# Patient Record
Sex: MALE | Race: WHITE | NOT HISPANIC OR LATINO | Employment: OTHER | URBAN - METROPOLITAN AREA
[De-identification: names, ages, dates, MRNs, and addresses within clinical notes are randomized per-mention and may not be internally consistent; named-entity substitution may affect disease eponyms.]

---

## 2017-01-02 ENCOUNTER — CLINICAL SUPPORT NO REQUIREMENTS (OUTPATIENT)
Dept: CARDIOLOGY | Facility: CLINIC | Age: 72
End: 2017-01-02

## 2017-01-03 ENCOUNTER — HOSPITAL ENCOUNTER (OUTPATIENT)
Dept: PHYSICAL THERAPY | Facility: HOSPITAL | Age: 72
Setting detail: THERAPIES SERIES
Discharge: HOME | End: 2017-01-03

## 2017-01-03 DIAGNOSIS — I63.533 CEREBROVASCULAR ACCIDENT (CVA) DUE TO BILATERAL STENOSIS OF POSTERIOR CEREBRAL ARTERIES (HCC): Primary | ICD-10-CM

## 2017-01-03 DIAGNOSIS — R53.1 RIGHT SIDED WEAKNESS: ICD-10-CM

## 2017-01-03 PROCEDURE — 97110 THERAPEUTIC EXERCISES: CPT | Performed by: PHYSICAL THERAPIST

## 2017-01-03 PROCEDURE — 97032 APPL MODALITY 1+ESTIM EA 15: CPT | Performed by: PHYSICAL THERAPIST

## 2017-01-03 PROCEDURE — 97112 NEUROMUSCULAR REEDUCATION: CPT | Performed by: PHYSICAL THERAPIST

## 2017-01-03 NOTE — PROGRESS NOTES
Outpatient Physical Therapy Ortho Treatment Note  [facility]     Patient Name: Brayan Cintron Jr  : 1945  MRN: [id number]  Today's Date: 1/3/2017      Visit Date: 2017    Visit Dx:    ICD-10-CM ICD-9-CM   1. Cerebrovascular accident (CVA) due to bilateral stenosis of posterior cerebral arteries I63.533 434.91   2. Right sided weakness M62.81 728.87       Patient Active Problem List   Diagnosis   • Hypertension   • SVT (supraventricular tachycardia)   • Chronic renal impairment, stage 2 (mild)   • Stroke   • History of shingles 3 months ago   • Cerebrovascular accident (CVA)   • Hyperlipidemia        Past Medical History   Diagnosis Date   • Acute renal failure    • Arrhythmia    • Chronic renal insufficiency    • Colon polyp    • Edema    • Hyperlipidemia    • Hypertension    • Melanoma    • XENA (obstructive sleep apnea)    • Right arm numbness    • Right facial numbness    • Right leg numbness    • Right sided weakness    • Shingles    • Squamous cell carcinoma    • Squamous cell carcinoma of skin    • Stroke    • SVT (supraventricular tachycardia)    • Syncope    • Tachycardia         Past Surgical History   Procedure Laterality Date   • Cardiac ablation     • Cardiac electrophysiology procedure N/A 10/3/2016     Procedure: Reveal implant  LINQ;  Surgeon: Wong Zavala MD;  Location: [facility] CATH INVASIVE LOCATION;  Service:    • Cyst removal       off tailbone   • Cardiac electrophysiology procedure Left 2016     Procedure: Loop recorder removal and reimplant  linq;  Surgeon: Wong Zavala MD;  Location: [facility] CATH INVASIVE LOCATION;  Service:                              PT Assessment/Plan       17 1730 16 1501       PT Assessment    Functional Limitations  Decreased safety during functional activities;Limitations in community activities;Performance in sport activities;Limitations in functional capacity and performance;Impaired gait;Performance in leisure  activities;Performance in self-care ADL;Limitation in home management  -KH     Impairments  Balance  -     Assessment Comments Initiated balance training today but patient appears frustrated by the difficulty of the balance exercises and with the lack of sensation/pain in his right outer foot. Attempted to strengthen VMO with patient in hookyling by performing SAQ's with NMES with 10 seconds on and 20 seconds rest. He could feel the sensation of the current but reported no change in sensation after the session. Discussed benefits of patient receiving therapy from speech and OT as well since he would truly benefit from all 3 disciplines and that if easier logistically her could transfer over there for PT as well.   - Patient is a 70 y/o male who recently suffered from 2 CVA's in the last 2 months that have left him with decreased sensation in his right thumb/index finger and right lower leg/foot, decreased R LE/hand strength, impaired balance, and a right lower quadrant visual cut. He reports being active prior to the stroke and regularly came to [place] to lift weights and swim. His biggest concern is the numbness in his right hand and foot. It is difficult for him to  small objects due to poor fine motor control and he now walks with his R LE externally rotated due to feeling off balance. His strength is nearly WNL with MMT but his R LE presents with ~1/2 deficit compared to the left. Overall his most noticeable strength deficits are in his right plantar flexor muscles, his right tip pinch  muscles, and his right hip abductors. He has difficulty maintaining balance with R SLS or tandem stance with R LE in front and is frustrated that he is unable to be as active as he was prior to the CVA. Patient would benefit from skilled PT to address balance and strength deficits in order to return to active lifestyle and improve quality of life.   -KRANTHI     Please refer to paper survey for additional  self-reported information  Yes  -     Rehab Potential  Good  -     Patient/caregiver participated in establishment of treatment plan and goals  Yes  -     Patient would benefit from skilled therapy intervention  Yes  -KH     PT Plan    PT Frequency  2x/week  -     Predicted Duration of Therapy Intervention (days/wks)  4 weeks  -     Planned CPT's?  PT EVAL: 18165;PT RE-EVAL: 74719;PT THER PROC EA 15 MIN: 96880;PT NEUROMUSC RE-EDUCATION EA 15 MIN: 61768;PT AQUATIC THERAPY EA 15 MIN: 96045;PT ULTRASOUND EA 15 MIN: 81659;PT HOT OR COLD PACK TREAT MCARE;PT MANUAL THERAPY EA 15 MIN: 13826;PT THER ACT EA 15 MIN: 73921;PT GAIT TRAINING EA 15 MIN: 01789;PT SELF CARE/HOME MGMT/TRAIN EA 15: 16832;PT ELECTRICAL STIM ATTD EA 15 MIN: 79550;PT ELECTRICAL STIM UNATTEND:   -     Physical Therapy Interventions (Optional Details)  aquatics exercise;gross motor skills;stretching;taping;home exercise program;balance training;joint mobilization;patient/family education;postural re-education;ROM (Range of Motion);stair training;strengthening;gait training;modalities;fine motor skills;dry needling;manual therapy techniques;lumbar stabilization  -     PT Plan Comments Progress with balance training and R LE strengthening. Continue with NMES to increase VMO strength and to potentially to try and activate right foot eversion muscles.   - Focus on balance training, R LE strengthening, fine motor skills, and consider NMES to help with diminished sensation  -       User Key  (r) = Recorded By, (t) = Taken By, (c) = Cosigned By    Initials Name Provider Type    KRANTHI To PT Physical Therapist                Modalities       01/03/17 1700          ELECTRICAL STIMULATION    Attended/Unattended Attended  -      Stimulation Type --   Ukrainian current  -      Max mAmp 23  -      Location/Electrode Placement/Other Right VMO/quadratus femoris  -      Rx Minutes 10 mins   10 seconds on/20 seconds off  -        User  Key  (r) = Recorded By, (t) = Taken By, (c) = Cosigned By    Initials Name Provider Type    KRANTHI To PT Physical Therapist      ** performed SAQ with knee over bolster and patient in hooklying          Exercises       01/03/17 1700          Subjective Comments    Subjective Comments Nothing has changed since my eval. It's still driving me nuts that I can't feel parts of my right leg and hand  -      Subjective Pain    Able to rate subjective pain? yes  -      Pre-Treatment Pain Level 2  -      Post-Treatment Pain Level 2  -      Subjective Pain Comment pins and needles in right foot, hand, and lower leg  -      Exercise 1    Exercise Name 1 see flowsheet  -        User Key  (r) = Recorded By, (t) = Taken By, (c) = Cosigned By    Initials Name Provider Type    KRANTHI To PT Physical Therapist                               PT OP Goals       01/03/17 1700 12/29/16 1400       PT Short Term Goals    STG Date to Achieve 01/28/17  -KH 01/28/17  -     STG 1 Patient will report 25% improvement in picking up small objects such as house keys  - Patient will report 25% improvement in picking up small objects such as house keys  -     STG 1 Progress Ongoing  - New  -     STG 2 Patient will increase tip pinch strength on the right hand from 10 lbs. to 15 lbs. or greater to help with everyday tasks  - Patient will increase tip pinch strength on the right hand from 10 lbs. to 15 lbs. or greater to help with everyday tasks  -     STG 2 Progress Ongoing  - New  -     STG 3 Patient will increase R LE SLS from 1-2 seconds to 10 seconds or great to prevent LOB/falls  - Patient will increase R LE SLS from 1-2 seconds to 10 seconds or great to prevent LOB/falls  -     STG 3 Progress Ongoing  - New  -     STG 3 Progress Comments ~3 seconds today  -      Long Term Goals    LTG Date to Achieve 02/27/17  -KH 02/27/17  -     LTG 1 Patient will perform 18 reps (or more) of full ROM S/L calf  rasises on the R LE to help decrease muscle disparity between R and L ankles and to help increase ankle stability for balance   -KH Patient will perform 18 reps (or more) of full ROM S/L calf rasises on the R LE to help decrease muscle disparity between R and L ankles and to help increase ankle stability for balance   -KH     LTG 1 Progress Ongoing  -KH New  -KH     LTG 2 Patient will decrease level of perceived disability as measured by the LEFS from 23.7% disability (61/80) to <15% disability  -KH Patient will decrease level of perceived disability as measured by the LEFS from 23.7% disability (61/80) to <15% disability  -KH     LTG 2 Progress Ongoing  -KH New  -KH     LTG 3 Patient will demonstrate safety and independence with strength training at [facility]  -KH Patient will demonstrate safety and independence with strength training at [facility]  -KH     LTG 3 Progress Ongoing  -KH New  -KH     Time Calculation    PT Goal Re-Cert Due Date  01/28/17  -       User Key  (r) = Recorded By, (t) = Taken By, (c) = Cosigned By    Initials Name Provider Type    KRANTHI To PT Physical Therapist                Therapy Education       12/29/16 1439    Therapy Education    Given HEP;Symptoms/condition management  -    Program New  -    How Provided Verbal;Demonstration  -KH    Provided to Patient  -    Level of Understanding Teach back education performed  -      User Key  (r) = Recorded By, (t) = Taken By, (c) = Cosigned By    Initials Name Provider Type    KRANTHI To PT Physical Therapist                Time Calculation:   Start Time: 1518  Stop Time: 1600  Time Calculation (min): 42 min    Therapy Charges for Today     Code Description Service Date Service Provider Modifiers Qty    23388910225 HC PT THER PROC EA 15 MIN 1/3/2017 Latrice To PT GP 1    12073811113 HC PT NEUROMUSC RE EDUCATION EA 15 MIN 1/3/2017 Latrice To PT GP 1    19075413561 HC PT ELEC STIM EA-PER 15 MIN 1/3/2017 Latrice To, BO GP  1                    Latrice To, PT  1/3/2017

## 2017-01-04 ENCOUNTER — CLINICAL SUPPORT NO REQUIREMENTS (OUTPATIENT)
Dept: CARDIOLOGY | Facility: CLINIC | Age: 72
End: 2017-01-04

## 2017-01-05 ENCOUNTER — HOSPITAL ENCOUNTER (OUTPATIENT)
Dept: PHYSICAL THERAPY | Facility: HOSPITAL | Age: 72
Setting detail: THERAPIES SERIES
Discharge: HOME | End: 2017-01-05

## 2017-01-05 DIAGNOSIS — I63.533 CEREBROVASCULAR ACCIDENT (CVA) DUE TO BILATERAL STENOSIS OF POSTERIOR CEREBRAL ARTERIES (HCC): Primary | ICD-10-CM

## 2017-01-05 DIAGNOSIS — R53.1 RIGHT SIDED WEAKNESS: ICD-10-CM

## 2017-01-05 PROCEDURE — 97112 NEUROMUSCULAR REEDUCATION: CPT | Performed by: PHYSICAL THERAPIST

## 2017-01-05 PROCEDURE — 97110 THERAPEUTIC EXERCISES: CPT | Performed by: PHYSICAL THERAPIST

## 2017-01-05 PROCEDURE — 97032 APPL MODALITY 1+ESTIM EA 15: CPT | Performed by: PHYSICAL THERAPIST

## 2017-01-05 NOTE — PROGRESS NOTES
Outpatient Physical Therapy Neuro Treatment Note  [facility]     Patient Name: Brayan Cintron Jr  : 1945  MRN: [id number]  Today's Date: 2017      Visit Date: 2017    Visit Dx:    ICD-10-CM ICD-9-CM   1. Cerebrovascular accident (CVA) due to bilateral stenosis of posterior cerebral arteries I63.533 434.91   2. Right sided weakness M62.81 728.87       Patient Active Problem List   Diagnosis   • Hypertension   • SVT (supraventricular tachycardia)   • Chronic renal impairment, stage 2 (mild)   • Stroke   • History of shingles 3 months ago   • Cerebrovascular accident (CVA)   • Hyperlipidemia                             PT Assessment/Plan       17 1430 17 1730       PT Assessment    Assessment Comments No changed in sensation reported today but increased the max amplitude on the NMES (Once again utilized [place] Island[place] current with 10 on 20 off and Brayan performed SAQ in hooklying with knee over bolster). Added more balance exercises today with M-L stability on balance board, mini squats and D/L stability on balance discs, and side step cable walks. Also added the clock exercise to work on weight shifting onto the R LE and to increase neuro input through WB. Brayan had difficulty coordinating his steps and shifting his weight while maintaining balance.   -KRANTHI Initiated balance training today but patient appears frustrated by the difficulty of the balance exercises and with the lack of sensation/pain in his right outer foot. Attempted to strengthen VMO with patient in hookyling by performing SAQ's with NMES with 10 seconds on and 20 seconds rest. He could feel the sensation of the current but reported no change in sensation after the session. Discussed benefits of patient receiving therapy from speech and OT as well since he would truly benefit from all 3 disciplines and that if easier logistically her could transfer over there for PT as well.   -KRANTHI     PT Plan    PT Plan Comments  Progress with  balance training and R LE strengthening. Continue with NMES to increase VMO strength and to potentially to try and activate right foot eversion muscles.   -KRANTHI       User Key  (r) = Recorded By, (t) = Taken By, (c) = Cosigned By    Initials Name Provider Type    KRANTHI To, BO Physical Therapist                 Modalities       01/05/17 1400          ELECTRICAL STIMULATION    Attended/Unattended Attended  -      Stimulation Type --   [place]udian Current  -      Max mAmp 38  -      Location/Electrode Placement/Other Right VMO/quadratus femoris  -      Rx Minutes 10 mins   10 sec on, 20 sec off. SAQ performed during on phase   -KRANTHI        User Key  (r) = Recorded By, (t) = Taken By, (c) = Cosigned By    Initials Name Provider Type    KRANTHI To PT Physical Therapist                Exercises       01/05/17 1400          Subjective Comments    Subjective Comments I tried walking for 30 min prior to therapy but my foot still feels like it's asleep. I'm trying to work on keeping my foot straight.   -KRANTHI      Subjective Pain    Able to rate subjective pain? yes  -KRANTHI      Pre-Treatment Pain Level 2  -KRANTHI      Post-Treatment Pain Level 2  -KRANTHI      Subjective Pain Comment not so much pain but feels like my foot and fingers are asleep  -KRANTHI      Exercise 1    Exercise Name 1 see flowsheet  -        User Key  (r) = Recorded By, (t) = Taken By, (c) = Cosigned By    Initials Name Provider Type    KRANTHI To, BO Physical Therapist                              PT OP Goals       01/03/17 1700 12/29/16 1400       PT Short Term Goals    STG Date to Achieve 01/28/17  -KRANTHI 01/28/17  -     STG 1 Patient will report 25% improvement in picking up small objects such as house keys  -KRATNHI Patient will report 25% improvement in picking up small objects such as house keys  -KRANTHI     STG 1 Progress Ongoing  - New  -     STG 2 Patient will increase tip pinch strength on the right hand from 10 lbs. to 15 lbs. or greater to help with  everyday tasks  -KH Patient will increase tip pinch strength on the right hand from 10 lbs. to 15 lbs. or greater to help with everyday tasks  -     STG 2 Progress Ongoing  - New  -     STG 3 Patient will increase R LE SLS from 1-2 seconds to 10 seconds or great to prevent LOB/falls  -KH Patient will increase R LE SLS from 1-2 seconds to 10 seconds or great to prevent LOB/falls  -     STG 3 Progress Ongoing  - New  -     STG 3 Progress Comments ~3 seconds today  -      Long Term Goals    LTG Date to Achieve 02/27/17  -KH 02/27/17  -     LTG 1 Patient will perform 18 reps (or more) of full ROM S/L calf rasises on the R LE to help decrease muscle disparity between R and L ankles and to help increase ankle stability for balance   -KH Patient will perform 18 reps (or more) of full ROM S/L calf rasises on the R LE to help decrease muscle disparity between R and L ankles and to help increase ankle stability for balance   -     LTG 1 Progress Ongoing  - New  [facility]     LTG 2 Patient will decrease level of perceived disability as measured by the LEFS from 23.7% disability (61/80) to <15% disability  -KH Patient will decrease level of perceived disability as measured by the LEFS from 23.7% disability (61/80) to <15% disability  -     LTG 2 Progress Ongoing  - New  [facility]     LTG 3 Patient will demonstrate safety and independence with strength training at [facility]  - Patient will demonstrate safety and independence with strength training at [facility]  -     LTG 3 Progress Ongoing  - New  [facility]     Time Calculation    PT Goal Re-Cert Due Date  01/28/17  -       User Key  (r) = Recorded By, (t) = Taken By, (c) = Cosigned By    Initials Name Provider Type    KRANTHI To PT Physical Therapist                    Time Calculation:   Start Time: 1420  Stop Time: 1515  Time Calculation (min): 55 min     Therapy Charges for Today     Code Description Service Date Service Provider Modifiers Qty     91067156144 HC PT THER PROC EA 15 MIN 1/5/2017 Latrice To, PT GP 1    71759635985 HC PT NEUROMUSC RE EDUCATION EA 15 MIN 1/5/2017 Latrice To, PT GP 2    37059520743 HC PT ELEC STIM EA-PER 15 MIN 1/5/2017 Latrice To, PT GP 1                    Latrice To, PT  1/5/2017

## 2017-01-10 ENCOUNTER — HOSPITAL ENCOUNTER (OUTPATIENT)
Dept: PHYSICAL THERAPY | Facility: HOSPITAL | Age: 72
Setting detail: THERAPIES SERIES
Discharge: HOME | End: 2017-01-10

## 2017-01-10 DIAGNOSIS — R53.1 RIGHT SIDED WEAKNESS: ICD-10-CM

## 2017-01-10 DIAGNOSIS — I63.533 CEREBROVASCULAR ACCIDENT (CVA) DUE TO BILATERAL STENOSIS OF POSTERIOR CEREBRAL ARTERIES (HCC): Primary | ICD-10-CM

## 2017-01-10 PROCEDURE — G0283 ELEC STIM OTHER THAN WOUND: HCPCS | Performed by: PHYSICAL THERAPIST

## 2017-01-10 PROCEDURE — 97110 THERAPEUTIC EXERCISES: CPT | Performed by: PHYSICAL THERAPIST

## 2017-01-10 PROCEDURE — 97112 NEUROMUSCULAR REEDUCATION: CPT | Performed by: PHYSICAL THERAPIST

## 2017-01-10 NOTE — PROGRESS NOTES
Outpatient Physical Therapy Ortho Treatment Note  [facility]     Patient Name: Brayan Cintron Jr  : 1945  MRN: [id number]  Today's Date: 1/10/2017      Visit Date: 01/10/2017    Visit Dx:    ICD-10-CM ICD-9-CM   1. Cerebrovascular accident (CVA) due to bilateral stenosis of posterior cerebral arteries I63.533 434.91   2. Right sided weakness M62.81 728.87       Patient Active Problem List   Diagnosis   • Hypertension   • SVT (supraventricular tachycardia)   • Chronic renal impairment, stage 2 (mild)   • Stroke   • History of shingles 3 months ago   • Cerebrovascular accident (CVA)   • Hyperlipidemia        Past Medical History   Diagnosis Date   • Acute renal failure    • Arrhythmia    • Chronic renal insufficiency    • Colon polyp    • Edema    • Hyperlipidemia    • Hypertension    • Melanoma    • XENA (obstructive sleep apnea)    • Right arm numbness    • Right facial numbness    • Right leg numbness    • Right sided weakness    • Shingles    • Squamous cell carcinoma    • Squamous cell carcinoma of skin    • Stroke    • SVT (supraventricular tachycardia)    • Syncope    • Tachycardia         Past Surgical History   Procedure Laterality Date   • Cardiac ablation     • Cardiac electrophysiology procedure N/A 10/3/2016     Procedure: Reveal implant  LINQ;  Surgeon: Wong Zavala MD;  Location: [facility] CATH INVASIVE LOCATION;  Service:    • Cyst removal       off tailbone   • Cardiac electrophysiology procedure Left 2016     Procedure: Loop recorder removal and reimplant  linq;  Surgeon: Wong Zavala MD;  Location: [facility] CATH INVASIVE LOCATION;  Service:                  PT Assessment/Plan       01/10/17 1512 17 1430 17 1730    PT Assessment    Assessment Comments Brayan tolerated treatment well.  He progressed therapeutic exercises by beginning cable walks with the forward and reverse directions.  -MP No changed in sensation reported today but increased the max amplitude on the  NMES (Once again utilized [place] current with 10 on 20 off and Brayan performed SAQ in hooklying with knee over bolster). Added more balance exercises today with M-L stability on balance board, mini squats and D/L stability on balance discs, and side step cable walks. Also added the clock exercise to work on weight shifting onto the R LE and to increase neuro input through WB. Brayan had difficulty coordinating his steps and shifting his weight while maintaining balance.   -KRANTHI Initiated balance training today but patient appears frustrated by the difficulty of the balance exercises and with the lack of sensation/pain in his right outer foot. Attempted to strengthen VMO with patient in hookyling by performing SAQ's with NMES with 10 seconds on and 20 seconds rest. He could feel the sensation of the current but reported no change in sensation after the session. Discussed benefits of patient receiving therapy from speech and OT as well since he would truly benefit from all 3 disciplines and that if easier logistically her could transfer over there for PT as well.   -    PT Plan    PT Plan Comments   Progress with balance training and R LE strengthening. Continue with NMES to increase VMO strength and to potentially to try and activate right foot eversion muscles.   -      User Key  (r) = Recorded By, (t) = Taken By, (c) = Cosigned By    Initials Name Provider Type    KRANTHI To, PT Physical Therapist    ELSA Perry, PT Physical Therapist                Exercises       01/10/17 1500          Subjective Pain    Able to rate subjective pain? yes  -MP      Pre-Treatment Pain Level 2  -MP      Post-Treatment Pain Level 2  -MP      Exercise 1    Exercise Name 1 Refer to land flow sheet  -MP      Exercise 2    Exercise Name 2 Progressed cable walks by adding the forward and reverse directions.  He did 12.5 lbs for those and left/right each x 3.  -        User Key  (r) = Recorded By, (t) = Taken By, (c) = Cosigned  By    Initials Name Provider Type    MP Jaden Perry, PT Physical Therapist                PT OP Goals       01/10/17 1500 01/03/17 1700 12/29/16 1400    PT Short Term Goals    STG Date to Achieve 01/28/17  -MP 01/28/17  -KH 01/28/17  -KH    STG 1 Patient will report 25% improvement in picking up small objects such as house keys  -MP Patient will report 25% improvement in picking up small objects such as house keys  -KH Patient will report 25% improvement in picking up small objects such as house keys  -KH    STG 1 Progress Ongoing  -MP Ongoing  -KH New  -    STG 2 Patient will increase tip pinch strength on the right hand from 10 lbs. to 15 lbs. or greater to help with everyday tasks  -MP Patient will increase tip pinch strength on the right hand from 10 lbs. to 15 lbs. or greater to help with everyday tasks  -KH Patient will increase tip pinch strength on the right hand from 10 lbs. to 15 lbs. or greater to help with everyday tasks  -KH    STG 2 Progress Ongoing  -MP Ongoing  - New  -    STG 3 Patient will increase R LE SLS from 1-2 seconds to 10 seconds or great to prevent LOB/falls  -MP Patient will increase R LE SLS from 1-2 seconds to 10 seconds or great to prevent LOB/falls  -KH Patient will increase R LE SLS from 1-2 seconds to 10 seconds or great to prevent LOB/falls  -    STG 3 Progress Ongoing  -MP Ongoing  - New  -    STG 3 Progress Comments  ~3 seconds today  -     Long Term Goals    LTG Date to Achieve 02/27/17  -MP 02/27/17  -KH 02/27/17  -KH    LTG 1 Patient will perform 18 reps (or more) of full ROM S/L calf rasises on the R LE to help decrease muscle disparity between R and L ankles and to help increase ankle stability for balance   -MP Patient will perform 18 reps (or more) of full ROM S/L calf rasises on the R LE to help decrease muscle disparity between R and L ankles and to help increase ankle stability for balance   -KH Patient will perform 18 reps (or more) of full ROM S/L calf  rasises on the R LE to help decrease muscle disparity between R and L ankles and to help increase ankle stability for balance   -    LTG 1 Progress Ongoing  -MP Ongoing  -KH New  -    LTG 2 Patient will decrease level of perceived disability as measured by the LEFS from 23.7% disability (61/80) to <15% disability  -MP Patient will decrease level of perceived disability as measured by the LEFS from 23.7% disability (61/80) to <15% disability  -KH Patient will decrease level of perceived disability as measured by the LEFS from 23.7% disability (61/80) to <15% disability  -KH    LTG 2 Progress Ongoing  -MP Ongoing  -KH New  -    LTG 3 Patient will demonstrate safety and independence with strength training at [facility]  -MP Patient will demonstrate safety and independence with strength training at [facility]  -KH Patient will demonstrate safety and independence with strength training at [facility]  -    LTG 3 Progress Ongoing  -MP Ongoing  - New  -    Time Calculation    PT Goal Re-Cert Due Date   01/28/17  -      User Key  (r) = Recorded By, (t) = Taken By, (c) = Cosigned By    Initials Name Provider Type     Latrice To PT Physical Therapist    MP aJden Perry PT Physical Therapist                Therapy Education       01/10/17 1512    Therapy Education    Given HEP  -MP    Program Reinforced  -MP    How Provided Verbal  -MP    Provided to Patient  -MP    Level of Understanding Verbalized  -MP      User Key  (r) = Recorded By, (t) = Taken By, (c) = Cosigned By    Initials Name Provider Type    ELSA Perry PT Physical Therapist        Time Calculation:   Start Time: 1415  Stop Time: 1500  Time Calculation (min): 45 min    Therapy Charges for Today     Code Description Service Date Service Provider Modifiers Qty    11894964998 HC PT NEUROMUSC RE EDUCATION EA 15 MIN 1/10/2017 Jaden Perry, PT GP 1    23636319592 HC PT THER PROC EA 15 MIN 1/10/2017 Jaden Perry, PT GP 1    47929578291 HC ELECTRICAL  STIM UNATTENDED 1/10/2017 Jaden Perry, PT  1        Jaden Perry, PT  1/10/2017

## 2017-01-12 ENCOUNTER — HOSPITAL ENCOUNTER (OUTPATIENT)
Dept: PHYSICAL THERAPY | Facility: HOSPITAL | Age: 72
Setting detail: THERAPIES SERIES
Discharge: HOME | End: 2017-01-12

## 2017-01-12 DIAGNOSIS — R53.1 RIGHT SIDED WEAKNESS: ICD-10-CM

## 2017-01-12 DIAGNOSIS — I63.533 CEREBROVASCULAR ACCIDENT (CVA) DUE TO BILATERAL STENOSIS OF POSTERIOR CEREBRAL ARTERIES (HCC): Primary | ICD-10-CM

## 2017-01-12 PROCEDURE — 97032 APPL MODALITY 1+ESTIM EA 15: CPT | Performed by: PHYSICAL THERAPIST

## 2017-01-12 PROCEDURE — 97110 THERAPEUTIC EXERCISES: CPT | Performed by: PHYSICAL THERAPIST

## 2017-01-12 PROCEDURE — 97112 NEUROMUSCULAR REEDUCATION: CPT | Performed by: PHYSICAL THERAPIST

## 2017-01-12 NOTE — PROGRESS NOTES
Outpatient Physical Therapy Neuro Treatment Note  [facility]     Patient Name: Brayan Cintron Jr  : 1945  MRN: [id number]  Today's Date: 2017      Visit Date: 2017    Visit Dx:    ICD-10-CM ICD-9-CM   1. Cerebrovascular accident (CVA) due to bilateral stenosis of posterior cerebral arteries I63.533 434.91   2. Right sided weakness M62.81 728.87       Patient Active Problem List   Diagnosis   • Hypertension   • SVT (supraventricular tachycardia)   • Chronic renal impairment, stage 2 (mild)   • Stroke   • History of shingles 3 months ago   • Cerebrovascular accident (CVA)   • Hyperlipidemia                             PT Assessment/Plan       17 1429 01/10/17 1512       PT Assessment    Assessment Comments Brayan demonstrated good stability on the cable walks but had slight LOB when side stepping to the right- especially when trying to control the reverse phase of the movement. Improved balance on discs but slightly difficulty maintaining stability on balance board.  -KRANTHI Brayan tolerated treatment well.  He progressed therapeutic exercises by beginning cable walks with the forward and reverse directions.  -MP     PT Plan    PT Plan Comments Progress with balance training and R LE strengthening. Continue NMES for VMO strengthening  -KRANTHI        User Key  (r) = Recorded By, (t) = Taken By, (c) = Cosigned By    Initials Name Provider Type    KRANTHI To PT Physical Therapist    ELSA Perry, PT Physical Therapist                 Modalities       17 1400          ELECTRICAL STIMULATION    Attended/Unattended Attended  -      Stimulation Type --   [place] current  -      Max mAmp 48  -      Location/Electrode Placement/Other Right VMO/quadratus femoris  -      Rx Minutes 10 mins   10 sec on, 20 sec off. SAQ performed during on phase  -KRANTHI        User Key  (r) = Recorded By, (t) = Taken By, (c) = Cosigned By    Initials Name Provider Type    KRANTHI To PT Physical Therapist                 Exercises       01/12/17 1400          Subjective Comments    Subjective Comments I'm feeling more confident that my numbness is eventually going to improve on its own  -      Subjective Pain    Able to rate subjective pain? yes  -KH      Pre-Treatment Pain Level 2  -KH      Post-Treatment Pain Level 2  -      Exercise 1    Exercise Name 1 Refer to land flow sheet  -        User Key  (r) = Recorded By, (t) = Taken By, (c) = Cosigned By    Initials Name Provider Type    KRANTHI To, PT Physical Therapist                              PT OP Goals       01/10/17 1500 01/03/17 1700       PT Short Term Goals    STG Date to Achieve 01/28/17  -MP 01/28/17  -     STG 1 Patient will report 25% improvement in picking up small objects such as house keys  -MP Patient will report 25% improvement in picking up small objects such as house keys  -     STG 1 Progress Ongoing  -MP Ongoing  -     STG 2 Patient will increase tip pinch strength on the right hand from 10 lbs. to 15 lbs. or greater to help with everyday tasks  -MP Patient will increase tip pinch strength on the right hand from 10 lbs. to 15 lbs. or greater to help with everyday tasks  -     STG 2 Progress Ongoing  - Ongoing  -     STG 3 Patient will increase R LE SLS from 1-2 seconds to 10 seconds or great to prevent LOB/falls  -MP Patient will increase R LE SLS from 1-2 seconds to 10 seconds or great to prevent LOB/falls  -     STG 3 Progress Ongoing  -MP Ongoing  -     STG 3 Progress Comments  ~3 seconds today  -     Long Term Goals    LTG Date to Achieve 02/27/17  -MP 02/27/17  -     LTG 1 Patient will perform 18 reps (or more) of full ROM S/L calf rasises on the R LE to help decrease muscle disparity between R and L ankles and to help increase ankle stability for balance   -MP Patient will perform 18 reps (or more) of full ROM S/L calf rasises on the R LE to help decrease muscle disparity between R and L ankles and to help  increase ankle stability for balance   -     LTG 1 Progress Ongoing  -MP Ongoing  -     LTG 2 Patient will decrease level of perceived disability as measured by the LEFS from 23.7% disability (61/80) to <15% disability  -MP Patient will decrease level of perceived disability as measured by the LEFS from 23.7% disability (61/80) to <15% disability  -     LTG 2 Progress Ongoing  -MP Ongoing  -     LTG 3 Patient will demonstrate safety and independence with strength training at [facility]  -MP Patient will demonstrate safety and independence with strength training at [facility]  -     LTG 3 Progress Ongoing  -MP Ongoing  -KH       User Key  (r) = Recorded By, (t) = Taken By, (c) = Cosigned By    Initials Name Provider Type    KRANTHI To PT Physical Therapist    ELSA Perry PT Physical Therapist                Therapy Education       01/10/17 1512    Therapy Education    Given HEP  -MP    Program Reinforced  -MP    How Provided Verbal  -MP    Provided to Patient  -MP    Level of Understanding Verbalized  -MP      User Key  (r) = Recorded By, (t) = Taken By, (c) = Cosigned By    Initials Name Provider Type    ELSA Perry PT Physical Therapist                Time Calculation:   Start Time: 1347  Stop Time: 1430  Time Calculation (min): 43 min     Therapy Charges for Today     Code Description Service Date Service Provider Modifiers Qty    00913369847  PT THER PROC EA 15 MIN 1/12/2017 Latrice To PT GP 1    08193262852  PT NEUROMUSC RE EDUCATION EA 15 MIN 1/12/2017 Latrice To PT GP 1    [id number]  PT ELEC STIM EA-PER 15 MIN 1/12/2017 Latrice To PT GP 1                    Latrice To PT  1/12/2017

## 2017-01-14 ENCOUNTER — CLINICAL SUPPORT NO REQUIREMENTS (OUTPATIENT)
Dept: CARDIOLOGY | Facility: CLINIC | Age: 72
End: 2017-01-14

## 2017-01-16 ENCOUNTER — CLINICAL SUPPORT NO REQUIREMENTS (OUTPATIENT)
Dept: CARDIOLOGY | Facility: CLINIC | Age: 72
End: 2017-01-16

## 2017-01-17 ENCOUNTER — HOSPITAL ENCOUNTER (OUTPATIENT)
Dept: PHYSICAL THERAPY | Facility: HOSPITAL | Age: 72
Setting detail: THERAPIES SERIES
Discharge: HOME | End: 2017-01-17

## 2017-01-17 DIAGNOSIS — R53.1 RIGHT SIDED WEAKNESS: ICD-10-CM

## 2017-01-17 DIAGNOSIS — I63.533 CEREBROVASCULAR ACCIDENT (CVA) DUE TO BILATERAL STENOSIS OF POSTERIOR CEREBRAL ARTERIES (HCC): Primary | ICD-10-CM

## 2017-01-17 PROCEDURE — 97112 NEUROMUSCULAR REEDUCATION: CPT | Performed by: PHYSICAL THERAPIST

## 2017-01-17 PROCEDURE — 97032 APPL MODALITY 1+ESTIM EA 15: CPT | Performed by: PHYSICAL THERAPIST

## 2017-01-17 PROCEDURE — 97110 THERAPEUTIC EXERCISES: CPT | Performed by: PHYSICAL THERAPIST

## 2017-01-17 NOTE — PROGRESS NOTES
Outpatient Physical Therapy Neuro Treatment Note  [facility]     Patient Name: Brayan Cintron Jr  : 1945  MRN: [id number]  Today's Date: 2017      Visit Date: 2017    Visit Dx:    ICD-10-CM ICD-9-CM   1. Cerebrovascular accident (CVA) due to bilateral stenosis of posterior cerebral arteries I63.533 434.91   2. Right sided weakness M62.81 728.87       Patient Active Problem List   Diagnosis   • Hypertension   • SVT (supraventricular tachycardia)   • Chronic renal impairment, stage 2 (mild)   • Stroke   • History of shingles 3 months ago   • Cerebrovascular accident (CVA)   • Hyperlipidemia                             PT Assessment/Plan       17 1357 17 1429 01/10/17 1512    PT Assessment    Assessment Comments Brayan's balance is noticeably improving however on the tandem walks he always seems to have a LOB when his right leg is in back due to lateral foot numbness making his feel less stable. He continues to demonstrate compensations with S/L heel raises on the Right but his calves are visibly active during D/L heel raises. Increased resistance level on cable walks today which patient was able to tolerate but was a little more unstable.  -KRANTHI Schmidt demonstrated good stability on the cable walks but had slight LOB when side stepping to the right- especially when trying to control the reverse phase of the movement. Improved balance on discs but slightly difficulty maintaining stability on balance board.  -KRANTHI Schmidt tolerated treatment well.  He progressed therapeutic exercises by beginning cable walks with the forward and reverse directions.  -MP    PT Plan    PT Plan Comments Progress with balance training and R LE strengthening. Continue NMES for VMO strengthening  -KRANTHI Progress with balance training and R LE strengthening. Continue NMES for VMO strengthening  -       User Key  (r) = Recorded By, (t) = Taken By, (c) = Cosigned By    Initials Name Provider Type    KRANTHI To PT  Physical Therapist    ELSA Perry, PT Physical Therapist                 Modalities       01/17/17 1300          ELECTRICAL STIMULATION    Attended/Unattended Attended  -      Stimulation Type --   Citizen of [place] Current  -      Max mAmp 45  -      Location/Electrode Placement/Other Right VMO/quadratus femoris  -      Rx Minutes 10 mins   10 sec on, 20 sec off. SAQ performed during on phase  -        User Key  (r) = Recorded By, (t) = Taken By, (c) = Cosigned By    Initials Name Provider Type    KRANTHI To PT Physical Therapist                Exercises       01/17/17 1300          Subjective Comments    Subjective Comments My numbness is about the same but I've notived a slight improvement in my inner knee muscles and my balance is noticeably better  -      Subjective Pain    Able to rate subjective pain? yes  -KRANTHI      Pre-Treatment Pain Level 2  -      Post-Treatment Pain Level 2  -      Exercise 1    Exercise Name 1 Refer to land flow sheet  -        User Key  (r) = Recorded By, (t) = Taken By, (c) = Cosigned By    Initials Name Provider Type    KRANTHI To, BO Physical Therapist                PT OP Goals       01/10/17 1500          PT Short Term Goals    STG Date to Achieve 01/28/17  -MP      STG 1 Patient will report 25% improvement in picking up small objects such as house keys  -MP      STG 1 Progress Ongoing  -MP      STG 2 Patient will increase tip pinch strength on the right hand from 10 lbs. to 15 lbs. or greater to help with everyday tasks  -MP      STG 2 Progress Ongoing  -MP      STG 3 Patient will increase R LE SLS from 1-2 seconds to 10 seconds or great to prevent LOB/falls  -MP      STG 3 Progress Ongoing  -MP      Long Term Goals    LTG Date to Achieve 02/27/17  -MP      LTG 1 Patient will perform 18 reps (or more) of full ROM S/L calf rasises on the R LE to help decrease muscle disparity between R and L ankles and to help increase ankle stability for balance   -      LTG  1 Progress Ongoing  -MP      LTG 2 Patient will decrease level of perceived disability as measured by the LEFS from 23.7% disability (61/80) to <15% disability  -MP      LTG 2 Progress Ongoing  -MP      LTG 3 Patient will demonstrate safety and independence with strength training at [facility]  -MP      LTG 3 Progress Ongoing  -MP        User Key  (r) = Recorded By, (t) = Taken By, (c) = Cosigned By    Initials Name Provider Type    ELSA Perry PT Physical Therapist                Therapy Education       01/10/17 1512    Therapy Education    Given HEP  -MP    Program Reinforced  -MP    How Provided Verbal  -MP    Provided to Patient  -MP    Level of Understanding Verbalized  -MP      User Key  (r) = Recorded By, (t) = Taken By, (c) = Cosigned By    Initials Name Provider Type    ELSA Perry PT Physical Therapist                Time Calculation:   Start Time: 1322  Stop Time: 1405  Time Calculation (min): 43 min     Therapy Charges for Today     Code Description Service Date Service Provider Modifiers Qty    58293072145 HC PT NEUROMUSC RE EDUCATION EA 15 MIN 1/17/2017 Latrice To, PT GP 1    67174464082 HC PT THER PROC EA 15 MIN 1/17/2017 Latrice To, PT GP 1    44333536577 HC PT ELEC STIM EA-PER 15 MIN 1/17/2017 Latrice To, PT GP 1                    Latrice To PT  1/17/2017

## 2017-01-19 ENCOUNTER — APPOINTMENT (OUTPATIENT)
Dept: PHYSICAL THERAPY | Facility: HOSPITAL | Age: 72
End: 2017-01-19

## 2017-01-19 ENCOUNTER — TELEPHONE (OUTPATIENT)
Dept: NEUROLOGY | Facility: CLINIC | Age: 72
End: 2017-01-19

## 2017-01-19 NOTE — TELEPHONE ENCOUNTER
S/W Sonya Cintron.  She states has not improved a lot since our last phone call.  He has been seen by  Dr. [facility] (neuro psych) in hopes of improving his memory difficulties.  He finally has been approved for PT and has started.  He gets tired so easily its been really difficult.  He does drive in the day with lots of light but only short distances.  I asked Sonya to bring in to his February appt the notes from Dr. Craig and any F/U papers she might have. Still taking Plavix 75mg daily, ASA 81mg daily, and BP meds.  MRS 3.  E Lilia SAMANIEGO

## 2017-01-19 NOTE — TELEPHONE ENCOUNTER
I called Mr. Cintron and left a message to find out how the numbness of his right foot and right hand and did his PCP order any PT for him.  I reminded him of his F/U appt with Karla Gallardo on Feb 3, 2017.  SAMANTHA Rodrigues RN

## 2017-01-20 ENCOUNTER — CLINICAL SUPPORT NO REQUIREMENTS (OUTPATIENT)
Dept: CARDIOLOGY | Facility: CLINIC | Age: 72
End: 2017-01-20

## 2017-01-21 ENCOUNTER — CLINICAL SUPPORT NO REQUIREMENTS (OUTPATIENT)
Dept: CARDIOLOGY | Facility: CLINIC | Age: 72
End: 2017-01-21

## 2017-01-24 ENCOUNTER — HOSPITAL ENCOUNTER (OUTPATIENT)
Dept: PHYSICAL THERAPY | Facility: HOSPITAL | Age: 72
Setting detail: THERAPIES SERIES
Discharge: HOME | End: 2017-01-24

## 2017-01-24 DIAGNOSIS — R53.1 RIGHT SIDED WEAKNESS: ICD-10-CM

## 2017-01-24 DIAGNOSIS — I63.533 CEREBROVASCULAR ACCIDENT (CVA) DUE TO BILATERAL STENOSIS OF POSTERIOR CEREBRAL ARTERIES (HCC): Primary | ICD-10-CM

## 2017-01-24 PROCEDURE — 97032 APPL MODALITY 1+ESTIM EA 15: CPT | Performed by: PHYSICAL THERAPIST

## 2017-01-24 PROCEDURE — 97112 NEUROMUSCULAR REEDUCATION: CPT | Performed by: PHYSICAL THERAPIST

## 2017-01-24 NOTE — PROGRESS NOTES
Outpatient Physical Therapy Neuro Treatment Note  [facility]     Patient Name: Brayan Cintron Jr  : 1945  MRN: [id number]  Today's Date: 2017      Visit Date: 2017    Visit Dx:    ICD-10-CM ICD-9-CM   1. Cerebrovascular accident (CVA) due to bilateral stenosis of posterior cerebral arteries I63.533 434.91   2. Right sided weakness M62.81 728.87       Patient Active Problem List   Diagnosis   • Hypertension   • SVT (supraventricular tachycardia)   • Chronic renal impairment, stage 2 (mild)   • Stroke   • History of shingles 3 months ago   • Cerebrovascular accident (CVA)   • Hyperlipidemia                             PT Assessment/Plan       17 1421          PT Assessment    Assessment Comments Utilized neurocom today to assess patient's COG in standing and initially he stood with only 30% of his weight on his R LE and 70% on the L LE. Worked on M-L weightshifting to allow for equalized WB and also performed weight shifting from center spot to lateral 3 locations that were right anterior, right lateral, and right posterior. Patient had the most difficulty with the right lateral location due to right lateral foot numbness but was able to shift all the way over with min A at the pelvis. Also educated patient on weight shifting through his feet and hips vs leaning from the shoulders and provided intermittent tactile cues.   -      PT Plan    PT Plan Comments Progress with balance training and R LE strengthening. Continue NMES for VMO strengthening  -KRANTHI        User Key  (r) = Recorded By, (t) = Taken By, (c) = Cosigned By    Initials Name Provider Type    KRANTHI To, PT Physical Therapist                 Modalities       17 1300          Subjective Comments    Subjective Comments The numbness in my thigh seems slightly better so it seems like the NMES is helping  -KRANTHI      Subjective Pain    Able to rate subjective pain? yes  -KRANTHI      Pre-Treatment Pain Level 2  -KRANTHI       Post-Treatment Pain Level 2  -KH      ELECTRICAL STIMULATION    Attended/Unattended Attended  -      Stimulation Type --   [place]  -KH      Max mAmp 48  -      Location/Electrode Placement/Other Right VMO/quadratus femoris  -      Rx Minutes 10 mins   10 sec on, 20 sec off. SAQ performed during on phase  -KRANTHI        User Key  (r) = Recorded By, (t) = Taken By, (c) = Cosigned By    Initials Name Provider Type    KRANTHI To PT Physical Therapist                Exercises       01/24/17 1300          Subjective Comments    Subjective Comments The numbness in my thigh seems slightly better so it seems like the NMES is helping  -      Subjective Pain    Able to rate subjective pain? yes  -      Pre-Treatment Pain Level 2  -KRANTHI      Post-Treatment Pain Level 2  -      Exercise 1    Exercise Name 1 Refer to land flow sheet  -KRANTHI        User Key  (r) = Recorded By, (t) = Taken By, (c) = Cosigned By    Initials Name Provider Type    KRANTHI To PT Physical Therapist                                      Time Calculation:   Start Time: 1325  Stop Time: 1420  Time Calculation (min): 55 min     Therapy Charges for Today     Code Description Service Date Service Provider Modifiers Qty    53082935925 HC PT ELEC STIM EA-PER 15 MIN 1/24/2017 Latrice To PT GP 1    59085777471 HC PT NEUROMUSC RE EDUCATION EA 15 MIN 1/24/2017 Latrice To PT GP 3                    Latrice To PT  1/24/2017

## 2017-01-26 ENCOUNTER — HOSPITAL ENCOUNTER (OUTPATIENT)
Dept: PHYSICAL THERAPY | Facility: HOSPITAL | Age: 72
Setting detail: THERAPIES SERIES
Discharge: HOME | End: 2017-01-26

## 2017-01-26 DIAGNOSIS — I63.533 CEREBROVASCULAR ACCIDENT (CVA) DUE TO BILATERAL STENOSIS OF POSTERIOR CEREBRAL ARTERIES (HCC): Primary | ICD-10-CM

## 2017-01-26 DIAGNOSIS — R53.1 RIGHT SIDED WEAKNESS: ICD-10-CM

## 2017-01-26 PROCEDURE — G8979 MOBILITY GOAL STATUS: HCPCS | Performed by: PHYSICAL THERAPIST

## 2017-01-26 PROCEDURE — G8978 MOBILITY CURRENT STATUS: HCPCS | Performed by: PHYSICAL THERAPIST

## 2017-01-26 PROCEDURE — 97112 NEUROMUSCULAR REEDUCATION: CPT | Performed by: PHYSICAL THERAPIST

## 2017-01-26 PROCEDURE — 97032 APPL MODALITY 1+ESTIM EA 15: CPT | Performed by: PHYSICAL THERAPIST

## 2017-01-26 NOTE — PROGRESS NOTES
Outpatient Physical Therapy Neuro Re-Assessment  [facility]     Patient Name: Brayan Cintron Jr  : 1945  MRN: [id number]  Today's Date: 2017      Visit Date: 2017    Patient Active Problem List   Diagnosis   • Hypertension   • SVT (supraventricular tachycardia)   • Chronic renal impairment, stage 2 (mild)   • Stroke   • History of shingles 3 months ago   • Cerebrovascular accident (CVA)   • Hyperlipidemia        Past Medical History   Diagnosis Date   • Acute renal failure    • Arrhythmia    • Chronic renal insufficiency    • Colon polyp    • Edema    • Hyperlipidemia    • Hypertension    • Melanoma    • XENA (obstructive sleep apnea)    • Right arm numbness    • Right facial numbness    • Right leg numbness    • Right sided weakness    • Shingles    • Squamous cell carcinoma    • Squamous cell carcinoma of skin    • Stroke    • SVT (supraventricular tachycardia)    • Syncope    • Tachycardia         Past Surgical History   Procedure Laterality Date   • Cardiac ablation     • Cardiac electrophysiology procedure N/A 10/3/2016     Procedure: Reveal implant  LINQ;  Surgeon: Wong Zavala MD;  Location: [facility] CATH INVASIVE LOCATION;  Service:    • Cyst removal       off tailbone   • Cardiac electrophysiology procedure Left 2016     Procedure: Loop recorder removal and reimplant  linq;  Surgeon: Wong Zavala MD;  Location:  [facility] CATH INVASIVE LOCATION;  Service:          Visit Dx:     ICD-10-CM ICD-9-CM   1. Cerebrovascular accident (CVA) due to bilateral stenosis of posterior cerebral arteries I63.533 434.91   2. Right sided weakness M62.81 728.87              Hand Therapy (last 24 hours)      Hand Eval       17 1400           Strength Right    # Reps 3   for tip pinch- Age norm=17 lbs  -KH      Right Rung 2  -KH      Right  Test 1 28  -KH      Right  Test 2 29  -KH      Right  Test 3 27  -KH       Strength Average Right 28  -KH        User Key  (r) =  Recorded By, (t) = Taken By, (c) = Cosigned By    Initials Name Provider Type    KRANTHI To, PT Physical Therapist              PT Ortho       01/26/17 1400    Left Ankle/Foot    Ankle PF Gross Movement (4/5) good   12 S/L calve raises  -    Right Ankle/Foot    Ankle PF Gross Movement (3+/5) fair plus   7 S/L calve raises  -    Balance Skills Training    SLS R LE- 5 seconds, L LE=10 seconds  -      User Key  (r) = Recorded By, (t) = Taken By, (c) = Cosigned By    Initials Name Provider Type    KRANTHI To, PT Physical Therapist                PT OP Goals       01/26/17 1400          PT Short Term Goals    STG Date to Achieve 01/28/17  -      STG 1 Patient will report 25% improvement in picking up small objects such as house keys  -      STG 1 Progress Ongoing  -      STG 1 Progress Comments Patient reports no improvement due to numbness. He feels the amount of area that feels numb has improved but the severity of the numbness in the affected areas remain the same  -      STG 2 Patient will increase tip pinch strength on the right hand from 10 lbs. to 15 lbs. or greater to help with everyday tasks  -      STG 2 Progress Met  -      STG 2 Progress Comments patient averaged 28 lbs. today when tested  -      STG 3 Patient will increase R LE SLS from 1-2 seconds to 10 seconds or great to prevent LOB/falls  -      STG 3 Progress Ongoing  -      STG 3 Progress Comments 5 seconds  -      Long Term Goals    LTG Date to Achieve 02/27/17  -      LTG 1 Patient will perform 18 reps (or more) of full ROM S/L calf rasises on the R LE to help decrease muscle disparity between R and L ankles and to help increase ankle stability for balance   -      LTG 1 Progress Ongoing  -      LTG 1 Progress Comments R calve raises=5-7 with good form  -      LTG 2 Patient will decrease level of perceived disability as measured by the LEFS from 23.7% disability (61/80) to <15% disability  -      LTG 2  Progress Partially Met  -      LTG 2 Progress Comments 53/60=12% disability (left last few questions blank due to not feeling able to answer running questions accurately since he hasn't tried to run)  -      LTG 3 Patient will demonstrate safety and independence with strength training at [facility]  -      LTG 3 Progress Ongoing  -        User Key  (r) = Recorded By, (t) = Taken By, (c) = Cosigned By    Initials Name Provider Type     Latrice To, PT Physical Therapist                PT Assessment/Plan       01/26/17 1416 01/24/17 1421       PT Assessment    Functional Limitations Decreased safety during functional activities;Limitations in community activities;Performance in sport activities;Limitations in functional capacity and performance;Impaired gait;Performance in leisure activities;Performance in self-care ADL;Limitation in home management  -      Impairments Balance;Sensation;Muscle strength;Gait;Dexterity  -      Assessment Comments Patient has now received 8 sessions of skilled PT for poor/numbness due to L sided CVA. He currently reports no change in the severity of his finger numnbess however feels that the area affected has become smaller. Recently he was able to utilize chopsticks demonstrating an improvement in his finger dexterity and fine motor control. His pinch  strength was WNL when tested today and improved from an average of 9.3 lbs to 28 lbs in the last month, however he required vc's to avoid compensating with his lateral 2 fingers. Patient reports the NMES to his VMO has helped with the sensation in his mid thigh and lower leg however his lateral foot numbness remains one of his biggest concerns. His R SLS balance has improved from 1-2 seconds to 5 seconds and today he was able to walk 50' in tandem with only one episode of LOB. He continues to present with poor calve strength bilaterally and is unable to complete more than 5-10 S/L heel raises through full ROM with his R  LE. One of his goals is to start running again which we plan on incorportating in to the next session. Recommend continued skilled PT to further improve patient's LE balance/stability in order to decrease risk of falls and return to active lifestyle.   -KRANTHI Utilized neurocom today to assess patient's COG in standing and initially he stood with only 30% of his weight on his R LE and 70% on the L LE. Worked on M-L weightshifting to allow for equalized WB and also performed weight shifting from center spot to lateral 3 locations that were right anterior, right lateral, and right posterior. Patient had the most difficulty with the right lateral location due to right lateral foot numbness but was able to shift all the way over with min A at the pelvis. Also educated patient on weight shifting through his feet and hips vs leaning from the shoulders and provided intermittent tactile cues.   -KRANTHI     Please refer to paper survey for additional self-reported information Yes  -KH      Rehab Potential Good  -KH      Patient/caregiver participated in establishment of treatment plan and goals Yes  -KH      Patient would benefit from skilled therapy intervention Yes  -KH      PT Plan    PT Frequency 2x/week  -KRANTHI      Predicted Duration of Therapy Intervention (days/wks) 4 weeks  -KRANTHI      Planned CPT's? PT THER PROC EA 15 MIN: 84968;PT EVAL: 00823;PT THER ACT EA 15 MIN: 78619;PT GAIT TRAINING EA 15 MIN: 78061;PT ELECTRICAL STIM ATTD EA 15 MIN: 23885;PT ULTRASOUND EA 15 MIN: 30441;PT HOT OR COLD PACK TREAT MCARE;PT ELECTRICAL STIM UNATTEND: ;PT NEUROMUSC RE-EDUCATION EA 15 MIN: 95313;PT RE-EVAL: 14305;PT MANUAL THERAPY EA 15 MIN: 04983;PT TRACTION CERVICAL: 80515;PT AQUATIC THERAPY EA 15 MIN: 56656;PT IONTOPHORESIS EA 15 MIN: 92293;PT TRACTION LUMBAR: 45233;PT PARAFFIN BATH: 02189  -      Physical Therapy Interventions (Optional Details) aquatics exercise;balance training;neuromuscular re-education;stretching;taping;transfer  training;patient/family education;postural re-education;stair training;strengthening;modalities;gait training;fine motor skills;manual therapy techniques;dry needling;lumbar stabilization;bed mobility training  -      PT Plan Comments Continue to progress with balance training and R LE strengthening as well as NMES for VMO strengthening  - Progress with balance training and R LE strengthening. Continue NMES for VMO strengthening  -       User Key  (r) = Recorded By, (t) = Taken By, (c) = Cosigned By    Initials Name Provider Type    KRANTHI To, BO Physical Therapist                 Modalities       01/26/17 1400          ELECTRICAL STIMULATION    Attended/Unattended Attended  -      Stimulation Type --   Russian  -      Max mAmp 48  -      Location/Electrode Placement/Other Right VMO/quadratus femoris  -      Rx Minutes 10 mins   10 sec on, 20 sec off. SAQ performed during on phase  -KRANTHI        User Key  (r) = Recorded By, (t) = Taken By, (c) = Cosigned By    Initials Name Provider Type    KRANTHI To, BO Physical Therapist              Exercises       01/26/17 1400          Subjective Comments    Subjective Comments I feel like my balance is starting to show some improvement  -      Subjective Pain    Able to rate subjective pain? yes  -      Pre-Treatment Pain Level 2  -      Post-Treatment Pain Level 2  -      Exercise 1    Exercise Name 1 Refer to land flow sheet  -        User Key  (r) = Recorded By, (t) = Taken By, (c) = Cosigned By    Initials Name Provider Type    KRANTHI To PT Physical Therapist              Outcome Measures       01/26/17 1400          Functional Assessment    Outcome Measure Options Lower Extremity Functional Scale (LEFS)   LEFS: 53/60 (12% disability) -avoided running questions  -        User Key  (r) = Recorded By, (t) = Taken By, (c) = Cosigned By    Initials Name Provider Type    KRANTHI To PT Physical Therapist          Time Calculation:    Start Time: 1345  Stop Time: 1430  Time Calculation (min): 45 min     Therapy Charges for Today     Code Description Service Date Service Provider Modifiers Qty    44174820616 HC PT ELEC STIM EA-PER 15 MIN 1/26/2017 Latrice To PT GP 1    02240506561 HC PT NEUROMUSC RE EDUCATION EA 15 MIN 1/26/2017 Latrice To PT GP 2    65874363756 HC PT MOBILITY CURRENT 1/26/2017 Latrice To PT GP, CI 1    89982497910 HC PT MOBILITY PROJECTED 1/26/2017 Latrice To PT GP,  1          PT G-Codes  PT Professional Judgement Used?: Yes  Outcome Measure Options: Lower Extremity Functional Scale (LEFS)  Score: LEFS: 53/60 (12% disability) -avoided running questions  Functional Limitation: Mobility: Walking and moving around  Mobility: Walking and Moving Around Current Status (): At least 1 percent but less than 20 percent impaired, limited or restricted  Mobility: Walking and Moving Around Goal Status (): 0 percent impaired, limited or restricted         Latrice To PT  1/26/2017

## 2017-01-30 ENCOUNTER — CLINICAL SUPPORT NO REQUIREMENTS (OUTPATIENT)
Dept: CARDIOLOGY | Facility: CLINIC | Age: 72
End: 2017-01-30

## 2017-01-30 DIAGNOSIS — I63.9 CRYPTOGENIC STROKE (HCC): Primary | ICD-10-CM

## 2017-01-30 PROCEDURE — 93298 REM INTERROG DEV EVAL SCRMS: CPT | Performed by: INTERNAL MEDICINE

## 2017-01-30 PROCEDURE — 93299 PR REM INTERROG ICPMS/SCRMS <30 D TECH REVIEW: CPT | Performed by: INTERNAL MEDICINE

## 2017-01-31 ENCOUNTER — HOSPITAL ENCOUNTER (OUTPATIENT)
Dept: PHYSICAL THERAPY | Facility: HOSPITAL | Age: 72
Setting detail: THERAPIES SERIES
Discharge: HOME | End: 2017-01-31

## 2017-01-31 ENCOUNTER — CLINICAL SUPPORT NO REQUIREMENTS (OUTPATIENT)
Dept: CARDIOLOGY | Facility: CLINIC | Age: 72
End: 2017-01-31

## 2017-01-31 DIAGNOSIS — I63.533 CEREBROVASCULAR ACCIDENT (CVA) DUE TO BILATERAL STENOSIS OF POSTERIOR CEREBRAL ARTERIES (HCC): Primary | ICD-10-CM

## 2017-01-31 DIAGNOSIS — R53.1 RIGHT SIDED WEAKNESS: ICD-10-CM

## 2017-01-31 PROCEDURE — 97032 APPL MODALITY 1+ESTIM EA 15: CPT | Performed by: PHYSICAL THERAPIST

## 2017-01-31 PROCEDURE — 97110 THERAPEUTIC EXERCISES: CPT | Performed by: PHYSICAL THERAPIST

## 2017-02-03 ENCOUNTER — OFFICE VISIT (OUTPATIENT)
Dept: NEUROLOGY | Facility: CLINIC | Age: 72
End: 2017-02-03

## 2017-02-03 VITALS
OXYGEN SATURATION: 97 % | DIASTOLIC BLOOD PRESSURE: 92 MMHG | SYSTOLIC BLOOD PRESSURE: 163 MMHG | HEIGHT: 70 IN | HEART RATE: 59 BPM | WEIGHT: 210 LBS | BODY MASS INDEX: 30.06 KG/M2

## 2017-02-03 DIAGNOSIS — I63.02 CEREBROVASCULAR ACCIDENT (CVA) DUE TO THROMBOSIS OF BASILAR ARTERY (HCC): ICD-10-CM

## 2017-02-03 DIAGNOSIS — I67.2 CEREBRAL ATHEROSCLEROSIS: ICD-10-CM

## 2017-02-03 DIAGNOSIS — I10 ESSENTIAL HYPERTENSION: Primary | ICD-10-CM

## 2017-02-03 DIAGNOSIS — E78.5 HYPERLIPIDEMIA, UNSPECIFIED HYPERLIPIDEMIA TYPE: ICD-10-CM

## 2017-02-03 PROCEDURE — 99213 OFFICE O/P EST LOW 20 MIN: CPT | Performed by: NURSE PRACTITIONER

## 2017-02-03 NOTE — PROGRESS NOTES
"DOS: 2/3/2017  NAME: Brayan Cintron Jr   : 1945  PCP: Citlaly Santoyo MD    Chief Complaint   Patient presents with   • Stroke      3 month f/u      Neurological Problem and Interval History:  71 y.o. RHW male with a Hx of  HLD, HTN and stroke who presents today for 3-month follow-up for stroke and is accompanied by his spouse.    Mr. Cintron presented to [facility] ED on  with right-sided numbness and gait instability and found to have left thalamic and left medial temporal lobe infarcts. He was previously admitted on 10/1/16 with right hemianopsia and found to have L PCA infarcts, was seen by Dr. Valentino, had a TTE as well as a LINQ placed and no etiology was found. At the time of that event he was on aspirin 81 mg and he was discharged on same. Vessel imaging showed stenoses of both posterior cerebral arteries with left PCA showing worsening stenosis and possibly new stenosis of the right MCA. The etiology of his strokes therefore is likely due to atherosclerotic disease. However, due to the fact that he also presented with headaches, he had an LP to rule out CNS vasculitis, in which CSF results were negative. His linq was also interrogated and per cardiology, no afib was noted. He was discharged on ASA, Plavix and lipitor. He continues with numbness of the right corner of his mouth, his right thumb and forerfinger and baby toe of right foot. He denies any headaches, weakness or any new stroke/TIA symptoms. He also continues with some \"small\" areas of vision loss on the right. He has seen ophthalmology and had formal visual field testing and per patient was cleared to drive. He is independent of all his ADLs and is back to doing most of his previous activities. However, due to the numbness he continues to struggle with some dexterity issues that keeps him from \"sewing\", putting buttons on shirts. He complains of increased fatigue and although he was a daily simmer prior to his events, he has not " "gotten back to daily exercise. His spouse reports that he still has some short term memory problems but that it is gradually improving and he agrees. He was seen by Dr. Vargas for neuropsych testing which confirmed mild memory impairments secondary to cerebrovascular events. He continues to get physical therapy as outpatient as well. He takes his BPs regularly and it \"averages 137/76\" per spouse. He denies smoking     Review of Systems:        Review of Systems   Constitutional: Negative for activity change, appetite change, chills, diaphoresis, fatigue, fever and unexpected weight change.   HENT: Negative for drooling, hearing loss, tinnitus, trouble swallowing and voice change.    Eyes: Negative for photophobia, pain and visual disturbance.   Respiratory: Negative for chest tightness and shortness of breath.    Cardiovascular: Negative for chest pain, palpitations and leg swelling.   Gastrointestinal: Positive for nausea (morningtime) and vomiting (always early in the mornings he has vomited several times). Negative for blood in stool.   Endocrine: Negative for cold intolerance and heat intolerance.   Genitourinary: Negative for difficulty urinating.   Musculoskeletal: Positive for gait problem (numbness on outside of right foot feels like there is a rock in his shoe makes him feel unsteady). Negative for neck pain and neck stiffness.   Skin: Negative for rash.   Neurological: Positive for weakness (right side weakness - patient is currently doing physical therapy) and numbness (right outside of foot and his thumb and pointer finger are all numb and tingling and corner of mouth). Negative for dizziness, tremors, seizures, syncope, facial asymmetry, speech difficulty, light-headedness and headaches.   Hematological: Does not bruise/bleed easily.   Psychiatric/Behavioral: Positive for agitation, behavioral problems, confusion (numbers) and sleep disturbance. Negative for hallucinations and suicidal ideas. The patient " "is not nervous/anxious.      \"The following portions of the patient's history were reviewed and updated as appropriate: allergies, current medications, past family history, past medical history, past social history, past surgical history and problem list.\"    Review and Interpretation of Imaging: Per Dr. Justice,  MRI brain 9/30/16: Patchy strokes in the left occipital lobe and medial left temporal lobe. Significant areas of confluent white matter disease in the periventricular white matter but also a couple patches in the right subcortical white matter suggesting prior large vessel stroke moderate ventriculomegaly GRE sequences are negative for hemorrhage.   MRA h/n 9/30/16: MRA of the neck with contrast is poor quality due to motion proximally but it appears that the left vertebral artery is a dominant vessel but is patent. There is tortuosity is origin. There is bilateral irregularity of the internal carotid arteries but no significant stenosis. Right vertebral artery is small in caliber and its origin is not well visualized. MRA of the head shows severe stenosis of the left posterior cerebral artery and P1 P2 junction and moderate to severe stenosis of the right PCA.  MRI brain 11/5/16: DWI shows a new acute stroke in the left thalamus. And a small stroke in the left medial temporal lobe. GRE sequences are negative   MRA h/n 11/5/16: MRA head shows occlusion of the left posterior cerebral artery possibly worsening stenosis of the right posterior cerebral artery irregularity of the right middle cerebral artery. MRA of the neck is unchanged.    Neuropsychological Evaluation 1/3//17: Reviewed, Vascular mild cognitive impairment per Dr. Vargas  Formal Vision Testing Results: Left homonymous inferior quadrantanopia       Laboratory Results:             Lab Results   Component Value Date    HGBA1C 5.50 11/05/2016     Lab Results   Component Value Date    CHOL 98 11/05/2016    CHOL 178 10/01/2016     Lab Results "   Component Value Date    HDL 29 (L) 11/05/2016    HDL 27 (L) 10/01/2016     No results found for: LDL  Lab Results   Component Value Date    TRIG 163 (H) 11/05/2016    TRIG 177 (H) 10/01/2016     No components found for: CHOLHDL  Lab Results   Component Value Date    RPR Non-Reactive 11/06/2016     Lab Results   Component Value Date    TSH 0.90 01/05/2015     Lab Results   Component Value Date    LDLCALC 36 11/05/2016     Lab Results   Component Value Date    SEDRATE 6 11/05/2016     Lab Results   Component Value Date    RPR Non-Reactive 11/06/2016     Lab Results   Component Value Date    CRP 0.05 11/05/2016       Fibrinogen 11/5/16: 362    Aspirin Respone 11/5/16: 411    LUMBAR PUNCTURE Results:   Lab Results   Component Value Date    COLORCSF Colorless 11/05/2016    APPEARCSF Clear 11/05/2016    RBCCSF 2 (H) 11/05/2016    TNUCCELLS 1 11/05/2016    PROTEINCSF 54.0 (H) 11/05/2016    GLUCCSF 77 (H) 11/05/2016     Lab Results   Component Value Date    CSFCX No growth at 5 days 11/05/2016    BARTQIGM Non Reactive 11/05/2016     IgG, CSF 0.0 - 8.6 mg/dL 4.2   Albumin, CSF 11 - 48 mg/dL 42   IgG 700 - 1600 mg/dL 948   Albumin 3.6 - 4.8 g/dL 4.3   IgG/Alb Ratio, CSF 0.00 - 0.25 0.10   IgG Index, CSF 0.0 - 0.7 0.5   IgG Synthesis Rate, CSF -9.9 TO +3.3 mg/day -2.9   CSF/Serum Albumin Index 0 - 8 10 (H)   Comments:          Relationship to blood-brain barrier:             Consistent with an intact barrier        <9             Slight impairment                   9 -  14             Moderate impairment                14 -  30             Severe impairment                  30 - 100             Complete breakdown                     >100     Outside Labs (Source: [facility], 1/12/17) - Reviewed  HgA1C  5.2  LDL cholesterol 48     Physical Examination: NIHSS: 1 mRS: 0  General Appearance:   Well developed, well nourished, well groomed, alert, and cooperative.  HEENT: Normocephalic.   Neck and  Spine: Normal range of motion.  Normal alignment. No mass or tenderness.  Cardiac: Regular rate and rhythm. No murmurs.  Peripheral Vasculature: Radial and pedal pulses are equal and symmetric. No signs of distal embolization.  Extremities:    No edema or deformities. Normal joint ROM.  Skin:    No rashes or birth marks.    Neurological examination:  Higher Integrative  Function: Oriented to time, place and person. Normal registration, recall, attention span and concentration. Normal language including comprehension, spontaneous speech, repetition, reading, writing, naming and vocabulary. No neglect with normal visual-spatial function and construction. Normal fund of knowledge and higher integrative function.  CN II: Pupils are equal, round, and reactive to light. No VF deficits on gross testing.    CN III IV VI: Extraocular movements are full without nystagmus.   CN V: Normal  muscles of mastication. Mild decreased sensation of right corner of mouth.  CN VII: Facial movements are symmetric. No weakness.  CN VIII:   Auditory acuity is normal.  CN IX & X:   Symmetric palatal movement.  CN XI: Sternocleidomastoid and trapezius are normal.  No weakness.  CN XII:   The tongue is midline.  No atrophy or fasciculations.  Motor: Normal muscle strength, bulk and tone in upper and lower extremities.  No fasciculations, rigidity, spasticity, or abnormal movements.  Reflexes: 2+ in the upper and lower extremities. Plantar responses are flexor.  Sensation: Normal to light touch. Subjective decreased sensation in right forefinger and thumb; right baby toe. Normal graphesthesia and no extinction on DSS.  Station and Gait: Normal gait and station.    Coordination: Finger to nose test shows no dysmetria.  Rapid alternating movements are normal.  Heel to shin normal.    Diagnoses / Discussion:  Mr. Cintron was seen today to follow-up for strokes he suffered in October and November 2015 that were likely due to intracranial  "arthrosclerotic disease. His cardiac work-up as well his evaluation for CNS vasculitis were negative. He is gradually improving but still struggles fatigue, some mild memory impairment and sensory loss on right.  He continues to get PT and is trying to get back into a regular exercise routine. He is tolerating his medications well but expressed some anxiety over being on \"so many medications\". We discussed importance of controlling risk factors for stroke prevention, including aggressive control of his BP. And athough elevated today, he takes his BP daily and his wife states his average SBP is below 140. Recommended that he keep monitoring his BP and following up with PCP for management.We discussed the signs/symptoms of stroke/TIA and the importance of calling 911 if he were to experience any of these. Continue ASA, Plavix and Lipitor. Follow up in 9 months.     Plan:   Continue ASA, Plavix, Lipitor   Continue to monitor and record BP, follow up with PCP for management   Blood pressure control to <130/80   Goal LDL <70-recommend high dose statins-    Serum glucose < 140     Call 911 for stroke any stroke symptoms   Follow-up 9 months.  There are no diagnoses linked to this encounter.    Coding    "

## 2017-02-04 ENCOUNTER — CLINICAL SUPPORT NO REQUIREMENTS (OUTPATIENT)
Dept: CARDIOLOGY | Facility: CLINIC | Age: 72
End: 2017-02-04

## 2017-02-05 ENCOUNTER — CLINICAL SUPPORT NO REQUIREMENTS (OUTPATIENT)
Dept: CARDIOLOGY | Facility: CLINIC | Age: 72
End: 2017-02-05

## 2017-02-06 ENCOUNTER — TELEPHONE (OUTPATIENT)
Dept: CARDIOLOGY | Facility: CLINIC | Age: 72
End: 2017-02-06

## 2017-02-06 ENCOUNTER — CLINICAL SUPPORT NO REQUIREMENTS (OUTPATIENT)
Dept: CARDIOLOGY | Facility: CLINIC | Age: 72
End: 2017-02-06

## 2017-02-06 NOTE — TELEPHONE ENCOUNTER
----- Message from Brayan Cintron Jr sent at 2/3/2017  7:09 PM EST -----  Regarding: Visit Follow-Up Question  Contact: [phone]  Following up on your request that I send BP readings.  For the month of January, my average numbers were   135 over 76 Average pulse at 56

## 2017-02-07 ENCOUNTER — HOSPITAL ENCOUNTER (OUTPATIENT)
Dept: PHYSICAL THERAPY | Facility: HOSPITAL | Age: 72
Setting detail: THERAPIES SERIES
Discharge: HOME | End: 2017-02-07

## 2017-02-07 PROCEDURE — 97112 NEUROMUSCULAR REEDUCATION: CPT | Performed by: PHYSICAL THERAPIST

## 2017-02-07 PROCEDURE — 97032 APPL MODALITY 1+ESTIM EA 15: CPT | Performed by: PHYSICAL THERAPIST

## 2017-02-07 RX ORDER — BENAZEPRIL HYDROCHLORIDE 20 MG/1
20 TABLET ORAL 2 TIMES DAILY
Qty: 180 TABLET | Refills: 2 | Status: SHIPPED | OUTPATIENT
Start: 2017-02-07 | End: 2017-04-14

## 2017-02-07 NOTE — PROGRESS NOTES
Outpatient Physical Therapy Neuro Treatment Note  [facility]     Patient Name: Brayan Cintron Jr  : 1945  MRN: [id number]  Today's Date: 2017      Visit Date: 2017    Visit Dx:  No diagnosis found.    Patient Active Problem List   Diagnosis   • Hypertension   • SVT (supraventricular tachycardia)   • Chronic renal impairment, stage 2 (mild)   • Stroke   • History of shingles 3 months ago   • Cerebrovascular accident (CVA)   • Hyperlipidemia   • Cerebral atherosclerosis               PT Assessment/Plan       17 1126          PT Assessment    Assessment Comments Brayan reports a significant improvement in his lip, finger, and toe numbness (~30%). Although the tingling is always there it is less pronounced. He reports very minimal numbness in his right knee/mid thigh and attributes the improvement to the NMES. Provided vcs during step ups to go slowly in order to focus on stability/balance. Brayan's balance with S/L stance improved to 12 seconds on each leg but was inconsistent with each repetition and some reps he was only able to maintain 5-7 seconds. He continues to demonstrate slight trunk compensation during S/L heel raises which is why he was limited to 5 reps today.   -      PT Plan    PT Plan Comments Continue progressing with PT for neuro re-education, balance training, and daily mobility.   -KRANTHI        User Key  (r) = Recorded By, (t) = Taken By, (c) = Cosigned By    Initials Name Provider Type    KRANTHI To PT Physical Therapist               Modalities       17 1100          ELECTRICAL STIMULATION    Attended/Unattended Attended  -      Stimulation Type --   russian  -KH      Max mAmp 50  -      Location/Electrode Placement/Other R VMO/quadratus femoris  -KRANTHI      Rx Minutes 10 mins   10 sec on, 20 sec off. SAQ performed during on phase  -KRANTHI        User Key  (r) = Recorded By, (t) = Taken By, (c) = Cosigned By    Initials Name Provider Type    KRANTHI To PT Physical  Therapist              PT OP Goals       02/07/17 1100 01/26/17 1400       PT Short Term Goals    STG Date to Achieve 01/28/17  -KH 01/28/17  -KH     STG 1 Patient will report 25% improvement in picking up small objects such as house keys  -KH Patient will report 25% improvement in picking up small objects such as house keys  -KH     STG 1 Progress Met  -KH Ongoing  -     STG 1 Progress Comments Patient reports a signficant improvement in his finger numbness (~30% better)  -KH Patient reports no improvement due to numbness. He feels the amount of area that feels numb has improved but the severity of the numbness in the affected areas remain the same  -KH     STG 2 Patient will increase tip pinch strength on the right hand from 10 lbs. to 15 lbs. or greater to help with everyday tasks  -KH Patient will increase tip pinch strength on the right hand from 10 lbs. to 15 lbs. or greater to help with everyday tasks  -KH     STG 2 Progress Met  - Met  -     STG 2 Progress Comments  patient averaged 28 lbs. today when tested  -KH     STG 3 Patient will increase R LE SLS from 1-2 seconds to 10 seconds or great to prevent LOB/falls  -KH Patient will increase R LE SLS from 1-2 seconds to 10 seconds or great to prevent LOB/falls  -KH     STG 3 Progress Met  -KH Ongoing  -     STG 3 Progress Comments 12 seconds today  -KH 5 seconds  -KH     Long Term Goals    LTG Date to Achieve 02/27/17  -KH 02/27/17  -KH     LTG 1 Patient will perform 18 reps (or more) of full ROM S/L calf rasises on the R LE to help decrease muscle disparity between R and L ankles and to help increase ankle stability for balance   -KH Patient will perform 18 reps (or more) of full ROM S/L calf rasises on the R LE to help decrease muscle disparity between R and L ankles and to help increase ankle stability for balance   -KH     LTG 1 Progress Ongoing  -KH Ongoing  -KH     LTG 1 Progress Comments Able to perform 5 today with good form  - ESEQUIEL rodriguez  raises=5-7 with good form  -     LTG 2 Patient will decrease level of perceived disability as measured by the LEFS from 23.7% disability (61/80) to <15% disability  - Patient will decrease level of perceived disability as measured by the LEFS from 23.7% disability (61/80) to <15% disability  -     LTG 2 Progress Partially Met  -KH Partially Met  -     LTG 2 Progress Comments  53/60=12% disability (left last few questions blank due to not feeling able to answer running questions accurately since he hasn't tried to run)  -     LTG 3 Patient will demonstrate safety and independence with strength training at [facility]  - Patient will demonstrate safety and independence with strength training at [facility]  -     LTG 3 Progress Progressing  - Ongoing  -     LTG 3 Progress Comments Patient reports returning to some of his former weight training exercises and has began running short distances again. He has not yet tried swimming but has been walking in the water  -        User Key  (r) = Recorded By, (t) = Taken By, (c) = Cosigned By    Initials Name Provider Type     Latrice To PT Physical Therapist      Time Calculation:   Start Time: 1035  Stop Time: 1115  Time Calculation (min): 40 min     Therapy Charges for Today     Code Description Service Date Service Provider Modifiers Qty    58362166143 HC PT NEUROMUSC RE EDUCATION EA 15 MIN 2/7/2017 Latrice To PT GP 2    74667943724 HC PT ELEC STIM EA-PER 15 MIN 2/7/2017 Latrice To PT GP 1                    Latrice To PT  2/7/2017

## 2017-02-14 ENCOUNTER — HOSPITAL ENCOUNTER (OUTPATIENT)
Dept: PHYSICAL THERAPY | Facility: HOSPITAL | Age: 72
Setting detail: THERAPIES SERIES
Discharge: HOME | End: 2017-02-14

## 2017-02-14 DIAGNOSIS — R53.1 RIGHT SIDED WEAKNESS: Primary | ICD-10-CM

## 2017-02-14 DIAGNOSIS — I63.533 CEREBROVASCULAR ACCIDENT (CVA) DUE TO BILATERAL STENOSIS OF POSTERIOR CEREBRAL ARTERIES (HCC): ICD-10-CM

## 2017-02-14 PROCEDURE — 97032 APPL MODALITY 1+ESTIM EA 15: CPT | Performed by: PHYSICAL THERAPIST

## 2017-02-14 PROCEDURE — 97112 NEUROMUSCULAR REEDUCATION: CPT | Performed by: PHYSICAL THERAPIST

## 2017-02-14 PROCEDURE — 97110 THERAPEUTIC EXERCISES: CPT | Performed by: PHYSICAL THERAPIST

## 2017-02-14 NOTE — PROGRESS NOTES
Outpatient Physical Therapy Neuro Treatment Note  [facility]     Patient Name: Brayan Cintron Jr  : 1945  MRN: [id number]  Today's Date: 2017      Visit Date: 2017    Visit Dx:    ICD-10-CM ICD-9-CM   1. Right sided weakness M62.81 728.87   2. Cerebrovascular accident (CVA) due to bilateral stenosis of posterior cerebral arteries I63.533 434.91       Patient Active Problem List   Diagnosis   • Hypertension   • SVT (supraventricular tachycardia)   • Chronic renal impairment, stage 2 (mild)   • Stroke   • History of shingles 3 months ago   • Cerebrovascular accident (CVA)   • Hyperlipidemia   • Cerebral atherosclerosis                             PT Assessment/Plan       17 1215          PT Assessment    Assessment Comments Brayan continues to progress with his exercise routine in the gym daily, and while his numbness is improving, he notices a difference in the feeling in his right knee/thigh if he hasn't received NMES in over a week. He was unable to maintain balance for as long in either LE during S/L stance and continues to demonstrate difficulty with S/L heel raises. Performed eccentric calve exercises instead to allow for S/L strengthening without compensations. Also added grapevine walks to challenge balance which patient was able complete with minimal difficulty.  -      PT Plan    PT Plan Comments Continue progressing with PT for neuro re-education, balance training, and daily mobility  -        User Key  (r) = Recorded By, (t) = Taken By, (c) = Cosigned By    Initials Name Provider Type    KRANTHI To, PT Physical Therapist                 Modalities       17 1200          ELECTRICAL STIMULATION    Attended/Unattended Attended  -      Stimulation Type --   Russian  -      Max [facility] 49  -      Location/Electrode Placement/Other R VMO/quadratus femoris  -      Rx Minutes 15 mins   10 sec on, 20 sec off. SAQ performed during on phase  -        User Key  (r) =  Recorded By, (t) = Taken By, (c) = Cosigned By    Initials Name Provider Type    KRANTHI To, PT Physical Therapist                Exercises       02/14/17 1200          Subjective Comments    Subjective Comments I'm starting get back to my normal workout routine. I've been running a few laps, doing my weights, swimming in the pool, etc.  -      Subjective Pain    Able to rate subjective pain? yes  -      Pre-Treatment Pain Level 0  -KH      Post-Treatment Pain Level 0  -KH      Exercise 1    Exercise Name 1 Refer to land flow sheet  -        User Key  (r) = Recorded By, (t) = Taken By, (c) = Cosigned By    Initials Name Provider Type    KRANTHI To, PT Physical Therapist                              PT OP Goals       02/14/17 1200 02/07/17 1100       PT Short Term Goals    STG Date to Achieve 01/28/17  -KH 01/28/17  -     STG 1 Patient will report 25% improvement in picking up small objects such as house keys  - Patient will report 25% improvement in picking up small objects such as house keys  -     STG 1 Progress Met  - Met  -     STG 1 Progress Comments  Patient reports a signficant improvement in his finger numbness (~30% better)  -     STG 2 Patient will increase tip pinch strength on the right hand from 10 lbs. to 15 lbs. or greater to help with everyday tasks  - Patient will increase tip pinch strength on the right hand from 10 lbs. to 15 lbs. or greater to help with everyday tasks  -     STG 2 Progress Met  -[facility]  -     STG 3 Patient will increase R LE SLS from 1-2 seconds to 10 seconds or great to prevent LOB/falls  - Patient will increase R LE SLS from 1-2 seconds to 10 seconds or great to prevent LOB/falls  -     STG 3 Progress Met  - Met  -     STG 3 Progress Comments  12 seconds today  -     Long Term Goals    LTG Date to Achieve 02/27/17  -KH 02/27/17  -     LTG 1 Patient will perform 18 reps (or more) of full ROM S/L calf rasises on the R LE to help  decrease muscle disparity between R and L ankles and to help increase ankle stability for balance   - Patient will perform 18 reps (or more) of full ROM S/L calf rasises on the R LE to help decrease muscle disparity between R and L ankles and to help increase ankle stability for balance   -     LTG 1 Progress Ongoing  -KH Ongoing  -     LTG 1 Progress Comments  Able to perform 5 today with good form  -     LTG 2 Patient will decrease level of perceived disability as measured by the LEFS from 23.7% disability (61/80) to <15% disability  - Patient will decrease level of perceived disability as measured by the LEFS from 23.7% disability (61/80) to <15% disability  -     LTG 2 Progress Partially Met  -KH Partially Met  -     LTG 3 Patient will demonstrate safety and independence with strength training at [facility]  - Patient will demonstrate safety and independence with strength training at [facility]  -     LTG 3 Progress Progressing  -KH Progressing  -     LTG 3 Progress Comments Getting close to returning to his prior exercise routine  - Patient reports returning to some of his former weight training exercises and has began running short distances again. He has not yet tried swimming but has been walking in the water  -       User Key  (r) = Recorded By, (t) = Taken By, (c) = Cosigned By    Initials Name Provider Type    KRANTHI To PT Physical Therapist                    Time Calculation:   Start Time: 1200  Stop Time: 1245  Time Calculation (min): 45 min     Therapy Charges for Today     Code Description Service Date Service Provider Modifiers Qty    95088094997  PT NEUROMUSC RE EDUCATION EA 15 MIN 2/14/2017 Latrice To, PT GP 1    [id number]  PT ELEC STIM EA-PER 15 MIN 2/14/2017 Latrice To PT GP 1    [id number]  PT THER PROC EA 15 MIN 2/14/2017 Latrice To PT GP 1                    Latrice To PT  2/14/2017

## 2017-02-16 ENCOUNTER — TELEPHONE (OUTPATIENT)
Dept: CARDIOLOGY | Facility: CLINIC | Age: 72
End: 2017-02-16

## 2017-02-16 ENCOUNTER — HOSPITAL ENCOUNTER (OUTPATIENT)
Dept: PHYSICAL THERAPY | Facility: HOSPITAL | Age: 72
Setting detail: THERAPIES SERIES
Discharge: HOME | End: 2017-02-16

## 2017-02-16 DIAGNOSIS — R53.1 RIGHT SIDED WEAKNESS: ICD-10-CM

## 2017-02-16 DIAGNOSIS — I63.533 CEREBROVASCULAR ACCIDENT (CVA) DUE TO BILATERAL STENOSIS OF POSTERIOR CEREBRAL ARTERIES (HCC): Primary | ICD-10-CM

## 2017-02-16 PROCEDURE — 97032 APPL MODALITY 1+ESTIM EA 15: CPT | Performed by: PHYSICAL THERAPIST

## 2017-02-16 PROCEDURE — 97110 THERAPEUTIC EXERCISES: CPT | Performed by: PHYSICAL THERAPIST

## 2017-02-16 PROCEDURE — 97112 NEUROMUSCULAR REEDUCATION: CPT | Performed by: PHYSICAL THERAPIST

## 2017-02-16 NOTE — PROGRESS NOTES
Outpatient Physical Therapy Ortho Treatment Note  [facility]     Patient Name: Brayan Cintron Jr  : 1945  MRN: [id number]  Today's Date: 2017      Visit Date: 2017    Visit Dx:    ICD-10-CM ICD-9-CM   1. Cerebrovascular accident (CVA) due to bilateral stenosis of posterior cerebral arteries I63.533 434.91   2. Right sided weakness M62.81 728.87       Patient Active Problem List   Diagnosis   • Hypertension   • SVT (supraventricular tachycardia)   • Chronic renal impairment, stage 2 (mild)   • Stroke   • History of shingles 3 months ago   • Cerebrovascular accident (CVA)   • Hyperlipidemia   • Cerebral atherosclerosis        Past Medical History   Diagnosis Date   • Acute renal failure    • Arrhythmia    • Chronic renal insufficiency    • Colon polyp    • Edema    • Hyperlipidemia    • Hypertension    • Melanoma    • XENA (obstructive sleep apnea)    • Right arm numbness    • Right facial numbness    • Right leg numbness    • Right sided weakness    • Shingles    • Squamous cell carcinoma    • Squamous cell carcinoma of skin    • Stroke    • SVT (supraventricular tachycardia)    • Syncope    • Tachycardia         Past Surgical History   Procedure Laterality Date   • Cardiac ablation     • Cardiac electrophysiology procedure N/A 10/3/2016     Procedure: Reveal implant  LINQ;  Surgeon: Wong Zavala MD;  Location: [facility] CATH INVASIVE LOCATION;  Service:    • Cyst removal       off tailbone   • Cardiac electrophysiology procedure Left 2016     Procedure: Loop recorder removal and reimplant  linq;  Surgeon: Wong Zavala MD;  Location: [facility] CATH INVASIVE LOCATION;  Service:                              PT Assessment/Plan       17 1219 17 1215       PT Assessment    Assessment Comments Brayan continues to respond well to the NMES and reports improved sensation in his quads following his PT sessions. Recommend switching to 1x/week to allow from continued NMES as well as  to further progress balance training. Observed him power walking on TM and he continues to slightly lele his right foot but it is less pronounced than when he started. He also occassionally drags his right foot and is aware of trying to increase his foot clearance.   -KRANTHI Brayan continues to progress with his exercise routine in the gym daily, and while his numbness is improving, he notices a difference in the feeling in his right knee/thigh if he hasn't received NMES in over a week. He was unable to maintain balance for as long in either LE during S/L stance and continues to demonstrate difficulty with S/L heel raises. Performed eccentric calve exercises instead to allow for S/L strengthening without compensations. Also added grapevine walks to challenge balance which patient was able complete with minimal difficulty.  -KRANTHI     PT Plan    PT Plan Comments Continue progressing with PT for neuro re-education, balance training, and daily mobility  -KRANTHI Continue progressing with PT for neuro re-education, balance training, and daily mobility  -KRANTHI       User Key  (r) = Recorded By, (t) = Taken By, (c) = Cosigned By    Initials Name Provider Type    KRANTHI To PT Physical Therapist                Modalities       02/16/17 1100          Subjective Comments    Subjective Comments I felt amazing after the NMES last session, it makes a huge difference when I get it  -KRANTHI      Subjective Pain    Able to rate subjective pain? yes  -      Pre-Treatment Pain Level 0  -      Post-Treatment Pain Level 0  -      ELECTRICAL STIMULATION    Attended/Unattended Attended  -      Stimulation Type --   Russian  -KRANTHI      Max mAmp 49  -      Location/Electrode Placement/Other R VMO/quadratus femoris  -      Rx Minutes 15 mins   10 sec on, 20 sec off. SAQ performed during on phase  -KRANTHI        User Key  (r) = Recorded By, (t) = Taken By, (c) = Cosigned By    Initials Name Provider Type    KRANTHI To PT Physical Therapist                 Exercises       02/16/17 1100          Subjective Comments    Subjective Comments I felt amazing after the NMES last session, it makes a huge difference when I get it  -KH      Subjective Pain    Able to rate subjective pain? yes  -KH      Pre-Treatment Pain Level 0  -KH      Post-Treatment Pain Level 0  -KH      Exercise 1    Exercise Name 1 Refer to land flow sheet  -        User Key  (r) = Recorded By, (t) = Taken By, (c) = Cosigned By    Initials Name Provider Type    KRANTHI To, PT Physical Therapist                               PT OP Goals       02/14/17 1200 02/07/17 1100       PT Short Term Goals    STG Date to Achieve 01/28/17  -KH 01/28/17  -KH     STG 1 Patient will report 25% improvement in picking up small objects such as house keys  -KH Patient will report 25% improvement in picking up small objects such as house keys  -     STG 1 Progress Met  - Met  -     STG 1 Progress Comments  Patient reports a signficant improvement in his finger numbness (~30% better)  -     STG 2 Patient will increase tip pinch strength on the right hand from 10 lbs. to 15 lbs. or greater to help with everyday tasks  -KH Patient will increase tip pinch strength on the right hand from 10 lbs. to 15 lbs. or greater to help with everyday tasks  -     STG 2 Progress Met  - Met  -     STG 3 Patient will increase R LE SLS from 1-2 seconds to 10 seconds or great to prevent LOB/falls  -KH Patient will increase R LE SLS from 1-2 seconds to 10 seconds or great to prevent LOB/falls  -     STG 3 Progress Met  - Met  -     STG 3 Progress Comments  12 seconds today  -     Long Term Goals    LTG Date to Achieve 02/27/17  -KH 02/27/17  -KH     LTG 1 Patient will perform 18 reps (or more) of full ROM S/L calf rasises on the R LE to help decrease muscle disparity between R and L ankles and to help increase ankle stability for balance   -KH Patient will perform 18 reps (or more) of full ROM S/L calf  rasises on the R LE to help decrease muscle disparity between R and L ankles and to help increase ankle stability for balance   -     LTG 1 Progress Ongoing  -KH Ongoing  -     LTG 1 Progress Comments  Able to perform 5 today with good form  -     LTG 2 Patient will decrease level of perceived disability as measured by the LEFS from 23.7% disability (61/80) to <15% disability  - Patient will decrease level of perceived disability as measured by the LEFS from 23.7% disability (61/80) to <15% disability  -     LTG 2 Progress Partially Met  -KH Partially Met  -     LTG 3 Patient will demonstrate safety and independence with strength training at [facility]  - Patient will demonstrate safety and independence with strength training at [facility]  -     LTG 3 Progress Progressing  - Progressing  -     LTG 3 Progress Comments Getting close to returning to his prior exercise routine  - Patient reports returning to some of his former weight training exercises and has began running short distances again. He has not yet tried swimming but has been walking in the water  -       User Key  (r) = Recorded By, (t) = Taken By, (c) = Cosigned By    Initials Name Provider Type    KRANTHI To PT Physical Therapist                    Time Calculation:   Start Time: 1200  Stop Time: 1244  Time Calculation (min): 44 min    Therapy Charges for Today     Code Description Service Date Service Provider Modifiers Qty    36221635725 HC PT ELEC STIM EA-PER 15 MIN 2/16/2017 Latrice To PT GP 1    85311340834 HC PT THER PROC EA 15 MIN 2/16/2017 Latrice To, PT GP 1    [id number] HC PT NEUROMUSC RE EDUCATION EA 15 MIN 2/16/2017 Latrice To PT GP 1                    Latrice To PT  2/16/2017

## 2017-02-19 ENCOUNTER — CLINICAL SUPPORT NO REQUIREMENTS (OUTPATIENT)
Dept: CARDIOLOGY | Facility: CLINIC | Age: 72
End: 2017-02-19

## 2017-02-21 ENCOUNTER — TELEPHONE (OUTPATIENT)
Dept: CARDIOLOGY | Facility: CLINIC | Age: 72
End: 2017-02-21

## 2017-02-21 ENCOUNTER — APPOINTMENT (OUTPATIENT)
Dept: PHYSICAL THERAPY | Facility: HOSPITAL | Age: 72
End: 2017-02-21

## 2017-02-21 NOTE — TELEPHONE ENCOUNTER
The patients wife called and said he needs an appt to see Dr Maxwell. Can you please call them and schedule him . [phone]

## 2017-02-22 ENCOUNTER — CLINICAL SUPPORT NO REQUIREMENTS (OUTPATIENT)
Dept: CARDIOLOGY | Facility: CLINIC | Age: 72
End: 2017-02-22

## 2017-02-23 ENCOUNTER — OFFICE VISIT (OUTPATIENT)
Dept: CARDIOLOGY | Facility: CLINIC | Age: 72
End: 2017-02-23

## 2017-02-23 ENCOUNTER — APPOINTMENT (OUTPATIENT)
Dept: PHYSICAL THERAPY | Facility: HOSPITAL | Age: 72
End: 2017-02-23

## 2017-02-23 VITALS
BODY MASS INDEX: 31.89 KG/M2 | HEIGHT: 68 IN | WEIGHT: 210.4 LBS | HEART RATE: 55 BPM | DIASTOLIC BLOOD PRESSURE: 80 MMHG | SYSTOLIC BLOOD PRESSURE: 110 MMHG

## 2017-02-23 DIAGNOSIS — I48.0 PAF (PAROXYSMAL ATRIAL FIBRILLATION) (HCC): ICD-10-CM

## 2017-02-23 DIAGNOSIS — I63.02 CEREBROVASCULAR ACCIDENT (CVA) DUE TO THROMBOSIS OF BASILAR ARTERY (HCC): ICD-10-CM

## 2017-02-23 DIAGNOSIS — I10 ESSENTIAL HYPERTENSION: ICD-10-CM

## 2017-02-23 DIAGNOSIS — I47.1 SVT (SUPRAVENTRICULAR TACHYCARDIA) (HCC): Primary | ICD-10-CM

## 2017-02-23 DIAGNOSIS — E78.5 HYPERLIPIDEMIA, UNSPECIFIED HYPERLIPIDEMIA TYPE: Primary | ICD-10-CM

## 2017-02-23 PROCEDURE — 99214 OFFICE O/P EST MOD 30 MIN: CPT | Performed by: INTERNAL MEDICINE

## 2017-02-23 RX ORDER — ATORVASTATIN CALCIUM 40 MG/1
40 TABLET, FILM COATED ORAL DAILY
Qty: 30 TABLET | Refills: 11 | Status: SHIPPED | OUTPATIENT
Start: 2017-02-23 | End: 2017-03-06

## 2017-02-23 RX ORDER — CARVEDILOL 12.5 MG/1
12.5 TABLET ORAL 2 TIMES DAILY
Qty: 180 TABLET | Refills: 3 | Status: SHIPPED | OUTPATIENT
Start: 2017-02-23 | End: 2017-07-25

## 2017-02-23 RX ORDER — AMLODIPINE BESYLATE 5 MG/1
5 TABLET ORAL DAILY
Qty: 90 TABLET | Refills: 3 | Status: SHIPPED | OUTPATIENT
Start: 2017-02-23 | End: 2017-07-25

## 2017-02-24 NOTE — PROGRESS NOTES
Date of Office Visit: 2017  Encounter Provider: Melissa Maxwell MD  Place of Service: [facility] CARDIOLOGY  Patient Name: Brayan Cnitron Jr  :1945    Chief complaint  Management of probable atrial fibrillation with history of paroxysmal superventricular tachycardia, stroke, nonsustained ventricular tachycardia and hypertension    History of Present Illness  Patient is a 71-year-old gentleman with history of hypertension, hyperlipidemia, renal insufficiency.  In  he had wide complex tachycardia and non-ST elevation myocardial infarction.  He had a stress test that did not reveal ischemia and underwent ablation of the AV node was reentry tachycardia which is felt to have mediated the myocardial infarction.  More recently in 2016 he complained of vision changes and was found to have 2 infarction the left PCA distribution.  He underwent transesophageal imaging that revealed no cord embolic source.  The loop recorder was placed and he was dismissed home on aspirin.  His Linq device checked in October had revealed no interim atrial arrhythmias with PACs have been present.  In 2016 with ongoing symptoms of right upper and lower extremity paresthesias and MRI of his head was obtained that revealed stenosis of both posterior cerebral arteries with progressive disease.  He underwent further neurologic testing and no clear etiology was discerned.  He was treated medically.  Blood pressure was elevated at the time and Coreg was increased Norvasc was decreased since he had PACs or frequent.    Since last interval visit his Linq device was repositioned.  Several days ago he was noted to have brief episodes of atrial fibrillation.  He denies any chest pain, shortness of breath, palpitations, syncope.  He is moderately active around his home.  He has lost 10 pounds intentionally.    Past Medical History   Diagnosis Date   • Acute renal failure    • Arrhythmia    • Chronic  renal insufficiency    • Colon polyp    • Edema    • Hyperlipidemia    • Hypertension    • Melanoma    • XENA (obstructive sleep apnea)    • Paroxysmal supraventricular tachycardia    • Right arm numbness    • Right facial numbness    • Right leg numbness    • Right sided weakness    • Shingles    • Squamous cell carcinoma    • Squamous cell carcinoma of skin    • Stroke    • Syncope    • Tachycardia      Past Surgical History   Procedure Laterality Date   • Cardiac ablation     • Cardiac electrophysiology procedure N/A 10/3/2016     Procedure: Reveal implant  LINQ;  Surgeon: Wong Zavala MD;  Location:  [facility];  Service:    • Cyst removal       off tailbone   • Cardiac electrophysiology procedure Left 12/19/2016     Procedure: Loop recorder removal and reimplant  linq;  Surgeon: Wong Zavala MD;  Location:  [facility];  Service:      Outpatient Medications Prior to Visit   Medication Sig Dispense Refill   • aspirin 81 MG tablet Take 81 mg by mouth daily.     • benazepril (LOTENSIN) 20 MG tablet Take 1 tablet by mouth 2 (Two) Times a Day. 180 tablet 2   • cholecalciferol (VITAMIN D3) 1000 UNITS tablet Take 2,000 Units by mouth Daily.     • clopidogrel (PLAVIX) 75 MG tablet Take 75 mg by mouth Daily.     • folic acid (FOLVITE) 1 MG tablet Take 2 mg by mouth Daily.     • terazosin (HYTRIN) 10 MG capsule Take 20 mg by mouth Every Night.     • amLODIPine (NORVASC) 10 MG tablet Take 10 mg by mouth Daily.     • atorvastatin (LIPITOR) 80 MG tablet Take 80 mg by mouth Daily.     • carvedilol (COREG) 6.25 MG tablet Take 9.37 mg by mouth 2 (Two) Times a Day With Meals.     • cephalexin (KEFLEX) 500 MG capsule Take 1 capsule by mouth 3 (Three) Times a Day. 21 capsule 0     No facility-administered medications prior to visit.      Allergies as of 02/23/2017   • (No Known Allergies)     Social History     Social History   • Marital status:      Spouse name: N/A   •  "Number of children: N/A   • Years of education: N/A     Occupational History   • Not on file.     Social History Main Topics   • Smoking status: Never Smoker   • Smokeless tobacco: Not on file   • Alcohol use Yes      Comment: very rare alcohol use   • Drug use: Yes     Special: Marijuana      Comment: on occ   • Sexual activity: Defer     Other Topics Concern   • Not on file     Social History Narrative     Family History   Problem Relation Age of Onset   • Hypertension Mother    • Heart disease Mother    • Transient ischemic attack Mother    • Diabetes Mother    • Heart attack Brother      Review of Systems   Constitution: Negative for fever, malaise/fatigue, weight gain and weight loss.   HENT: Negative for ear pain, hearing loss, nosebleeds and sore throat.    Eyes: Positive for visual disturbance. Negative for double vision, pain, vision loss in left eye and vision loss in right eye.   Cardiovascular:        See history of present illness.   Respiratory: Negative for cough, shortness of breath, sleep disturbances due to breathing, snoring and wheezing.    Endocrine: Negative for cold intolerance, heat intolerance and polyuria.   Skin: Negative for itching, poor wound healing and rash.   Musculoskeletal: Negative for joint pain, joint swelling and myalgias.   Gastrointestinal: Negative for abdominal pain, diarrhea, hematochezia, nausea and vomiting.   Genitourinary: Negative for hematuria and hesitancy.   Neurological: Negative for numbness, paresthesias and seizures.   Psychiatric/Behavioral: Negative for depression. The patient is not nervous/anxious.      Objective:     Vitals:    02/23/17 1211   BP: 110/80   Pulse: 55   Weight: 210 lb 6.4 oz (95.4 kg)   Height: 68\" (172.7 cm)     Body mass index is 31.99 kg/(m^2).    Physical Exam   Constitutional: He is oriented to person, place, and time. He appears well-developed and well-nourished.   Obese   HENT:   Head: Normocephalic.   Nose: Nose normal. "   Mouth/Throat: Oropharynx is clear and moist.   Eyes: Conjunctivae and EOM are normal. Pupils are equal, round, and reactive to light. Right eye exhibits no discharge. No scleral icterus.   Neck: Normal range of motion. Neck supple. No JVD present. No thyromegaly present.   Cardiovascular: Normal rate, regular rhythm, normal heart sounds and intact distal pulses.  Exam reveals no gallop and no friction rub.    No murmur heard.  Pulses:       Carotid pulses are 2+ on the right side, and 2+ on the left side.       Radial pulses are 2+ on the right side, and 2+ on the left side.        Femoral pulses are 2+ on the right side, and 2+ on the left side.       Popliteal pulses are 2+ on the right side, and 2+ on the left side.        Dorsalis pedis pulses are 2+ on the right side, and 2+ on the left side.        Posterior tibial pulses are 2+ on the right side, and 2+ on the left side.   Pulmonary/Chest: Effort normal and breath sounds normal. No respiratory distress. He has no wheezes. He has no rales.   Abdominal: Soft. Bowel sounds are normal. He exhibits no distension. There is no hepatosplenomegaly. There is no tenderness. There is no rebound.   Musculoskeletal: Normal range of motion. He exhibits no edema or tenderness.   Neurological: He is alert and oriented to person, place, and time.   Skin: Skin is warm and dry. No rash noted. No erythema.   Psychiatric: He has a normal mood and affect. His behavior is normal. Judgment and thought content normal.   Vitals reviewed.    Lab Review:     ECG 12 Lead  Date/Time: 2/26/2017 9:25 AM  Performed by: GLEN MCCARTHY  Authorized by: LGEN MCCARTHY   Comparison: compared with previous ECG   Similar to previous ECG  Rhythm: sinus rhythm  Conduction comments: Nonspecific ST T wave changes  QTc =421 msec  Clinical impression: abnormal ECG          Assessment:       Diagnosis Plan   1. SVT (supraventricular tachycardia)     2. Essential hypertension     3. 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 PAF (paroxysmal atrial fibrillation)       Plan:       1.  Paroxysmal atrial fibrillation.  Will increase Coreg to 12.5 mg by mouth twice a day.  I also recommended he change from aspirin and Plavix to anticoagulation.  We discussed pros and cons of warfarin versus a novel agent including Eliquis which the patient's wife prefers.  I concur and recommended Eliquis 5 mg twice a day.  [facility] and let me know by morning but they wish to do.  2.  History of Linq placement as above  3.  History of paroxysmal supra-ventricular tachycardia, s/p ablation  4.  History of ventricular tachycardia  5.  History of stroke    Atrial Fibrillation and Atrial Flutter  Assessment  • The patient has paroxysmal atrial fibrillation  • This is non-valvular in etiology  • The patient's CHADS2-VASc score is 4  • A [id number] score of 2 or more is considered a high risk for a thromboembolic event  • Warfarin not prescribed  • Dabigatran not prescribed  • Rivaroxaban not prescribed  • Apixaban not prescribed  • Aspirin prescribed    Plan  • Attempt to maintain sinus rhythm  • Continue aspirin for antithrombotic therapy, bleeding issues discussed  • Add apixaban for antithrombotic therapy  • Continue beta blocker for rhythm control     Brayan Cintron Jr   Home Medication Instructions HIMA:    Printed on:02/26/17 6514   Medication Information                      amLODIPine (NORVASC) 5 MG tablet  Take 1 tablet by mouth Daily.             aspirin 81 MG tablet  Take 81 mg by mouth daily.             atorvastatin (LIPITOR) 40 MG tablet  Take 1 tablet by mouth Daily.             benazepril (LOTENSIN) 20 MG tablet  Take 1 tablet by mouth 2 (Two) Times a Day.             carvedilol (COREG) 12.5 MG tablet  Take 1 tablet by mouth 2 (Two) Times a Day.             cholecalciferol (VITAMIN D3) 1000 UNITS tablet  Take 2,000 Units by mouth Daily.             clopidogrel  (PLAVIX) 75 MG tablet  Take 75 mg by mouth Daily.             folic acid (FOLVITE) 1 MG tablet  Take 2 mg by mouth Daily.             terazosin (HYTRIN) 10 MG capsule  Take 20 mg by mouth Every Night.                 New Medications Ordered This Visit   Medications   • carvedilol (COREG) 12.5 MG tablet     Sig: Take 1 tablet by mouth 2 (Two) Times a Day.     Dispense:  180 tablet     Refill:  3   • amLODIPine (NORVASC) 5 MG tablet     Sig: Take 1 tablet by mouth Daily.     Dispense:  90 tablet     Refill:  3       EMR Dragon/Transcription disclaimer:   Much of this encounter note is an electronic transcription/translation of spoken language to printed text. The electronic translation of spoken language may permit erroneous, or at times, nonsensical words or phrases to be inadvertently transcribed; Although I have reviewed the note for such errors, some may still exist.

## 2017-02-25 ENCOUNTER — CLINICAL SUPPORT NO REQUIREMENTS (OUTPATIENT)
Dept: CARDIOLOGY | Facility: CLINIC | Age: 72
End: 2017-02-25

## 2017-02-26 PROBLEM — I48.0 PAF (PAROXYSMAL ATRIAL FIBRILLATION) (HCC): Status: ACTIVE | Noted: 2017-02-26

## 2017-02-26 PROCEDURE — 93000 ELECTROCARDIOGRAM COMPLETE: CPT | Performed by: INTERNAL MEDICINE

## 2017-02-27 ENCOUNTER — CLINICAL SUPPORT NO REQUIREMENTS (OUTPATIENT)
Dept: CARDIOLOGY | Facility: CLINIC | Age: 72
End: 2017-02-27

## 2017-02-28 ENCOUNTER — DOCUMENTATION (OUTPATIENT)
Dept: CARDIOLOGY | Facility: CLINIC | Age: 72
End: 2017-02-28

## 2017-02-28 ENCOUNTER — TELEPHONE (OUTPATIENT)
Dept: CARDIOLOGY | Facility: CLINIC | Age: 72
End: 2017-02-28

## 2017-02-28 NOTE — TELEPHONE ENCOUNTER
I agree, pharmacist should not be making these decisions by giving this advice which is actually quite incorrect.     Find out from the patient and wife what they wanted take, Eliquis, Xarelto or Coumadin. tatum

## 2017-02-28 NOTE — TELEPHONE ENCOUNTER
JEAN-PAULI: Called pharmacist @ [facility] [phone] re: PA approval.  Cost came back as $130/ mo.  Pharmacist advised pt to get Coumadin and eat lots of greens.  Asked me if pt had a stroke and proceeded to tell us that Plavix was not acceptable either.  Advised pharmacist that this was a decision for Dr Maxwell not PharmD on what is acceptable for the pt to take.

## 2017-03-01 ENCOUNTER — CLINICAL SUPPORT NO REQUIREMENTS (OUTPATIENT)
Dept: CARDIOLOGY | Facility: CLINIC | Age: 72
End: 2017-03-01

## 2017-03-02 ENCOUNTER — CLINICAL SUPPORT NO REQUIREMENTS (OUTPATIENT)
Dept: CARDIOLOGY | Facility: CLINIC | Age: 72
End: 2017-03-02

## 2017-03-02 DIAGNOSIS — I63.9 CRYPTOGENIC STROKE (HCC): Primary | ICD-10-CM

## 2017-03-02 PROCEDURE — 93299 PR REM INTERROG ICPMS/SCRMS <30 D TECH REVIEW: CPT | Performed by: INTERNAL MEDICINE

## 2017-03-02 PROCEDURE — 93298 REM INTERROG DEV EVAL SCRMS: CPT | Performed by: INTERNAL MEDICINE

## 2017-03-03 ENCOUNTER — HOSPITAL ENCOUNTER (OUTPATIENT)
Dept: PHYSICAL THERAPY | Facility: HOSPITAL | Age: 72
Setting detail: THERAPIES SERIES
Discharge: HOME | End: 2017-03-03

## 2017-03-03 ENCOUNTER — CLINICAL SUPPORT NO REQUIREMENTS (OUTPATIENT)
Dept: CARDIOLOGY | Facility: CLINIC | Age: 72
End: 2017-03-03

## 2017-03-03 DIAGNOSIS — I63.533 CEREBROVASCULAR ACCIDENT (CVA) DUE TO BILATERAL STENOSIS OF POSTERIOR CEREBRAL ARTERIES (HCC): Primary | ICD-10-CM

## 2017-03-03 DIAGNOSIS — R53.1 RIGHT SIDED WEAKNESS: ICD-10-CM

## 2017-03-03 PROCEDURE — 97032 APPL MODALITY 1+ESTIM EA 15: CPT | Performed by: PHYSICAL THERAPIST

## 2017-03-03 PROCEDURE — G8979 MOBILITY GOAL STATUS: HCPCS | Performed by: PHYSICAL THERAPIST

## 2017-03-03 PROCEDURE — 97110 THERAPEUTIC EXERCISES: CPT | Performed by: PHYSICAL THERAPIST

## 2017-03-03 PROCEDURE — G8978 MOBILITY CURRENT STATUS: HCPCS | Performed by: PHYSICAL THERAPIST

## 2017-03-03 NOTE — PROGRESS NOTES
Outpatient Physical Therapy Ortho Re-Assessment  [facility]     Patient Name: Brayan Cintron Jr  : 1945  MRN: [id number]  Today's Date: 3/3/2017      Visit Date: 2017    Patient Active Problem List   Diagnosis   • Hypertension   • SVT (supraventricular tachycardia)   • Chronic renal impairment, stage 2 (mild)   • Stroke   • History of shingles 3 months ago   • Cerebrovascular accident (CVA)   • Hyperlipidemia   • Cerebral atherosclerosis   • PAF (paroxysmal atrial fibrillation)        Past Medical History   Diagnosis Date   • Acute renal failure    • Arrhythmia    • Chronic renal insufficiency    • Colon polyp    • Edema    • Hyperlipidemia    • Hypertension    • Melanoma    • XENA (obstructive sleep apnea)    • Paroxysmal supraventricular tachycardia    • Right arm numbness    • Right facial numbness    • Right leg numbness    • Right sided weakness    • Shingles    • Squamous cell carcinoma    • Squamous cell carcinoma of skin    • Stroke    • Syncope    • Tachycardia         Past Surgical History   Procedure Laterality Date   • Cardiac ablation     • Cardiac electrophysiology procedure N/A 10/3/2016     Procedure: Reveal implant  LINQ;  Surgeon: Wong Zavala MD;  Location: [facility] CATH INVASIVE LOCATION;  Service:    • Cyst removal       off tailbone   • Cardiac electrophysiology procedure Left 2016     Procedure: Loop recorder removal and reimplant  linq;  Surgeon: Wong Zavala MD;  Location: [facility] CATH INVASIVE LOCATION;  Service:        Visit Dx:     ICD-10-CM ICD-9-CM   1. Cerebrovascular accident (CVA) due to bilateral stenosis of posterior cerebral arteries I63.533 434.91   2. Right sided weakness M62.81 728.87               PT OP Goals       17 1100       PT Short Term Goals    STG Date to Achieve 17  -     STG 1 Patient will report 25% improvement in picking up small objects such as house keys  -     STG 1 Progress Met  -     STG 2 Patient will  increase tip pinch strength on the right hand from 10 lbs. to 15 lbs. or greater to help with everyday tasks  -     STG 2 Progress Met  -     STG 3 Patient will increase R LE SLS from 1-2 seconds to 10 seconds or great to prevent LOB/falls  -     STG 3 Progress Met  -     Long Term Goals    LTG Date to Achieve 04/02/17  -     LTG 1 Patient will perform 18 reps (or more) of full ROM S/L calf rasises on the R LE to help decrease muscle disparity between R and L ankles and to help increase ankle stability for balance   -     LTG 1 Progress Ongoing  -     LTG 1 Progress Comments 12 reps today  -     LTG 2 Patient will decrease level of perceived disability as measured by the LEFS from 23.7% disability (61/80) to <15% disability  -     LTG 2 Progress Met  -     LTG 2 Progress Comments 4% disability (77/80)  -     LTG 3 Patient will demonstrate safety and independence with strength training at [facility]  -     LTG 3 Progress Met  -     LTG 3 Progress Comments Patient has returned to his previous exercise routine  -       User Key  (r) = Recorded By, (t) = Taken By, (c) = Cosigned By    Initials Name Provider Type     Latrice To, PT Physical Therapist                PT Assessment/Plan       03/03/17 1125       PT Assessment    Functional Limitations Decreased safety during functional activities;Limitations in community activities;Performance in sport activities;Limitations in functional capacity and performance;Impaired gait;Performance in leisure activities;Performance in self-care ADL;Limitation in home management  -     Impairments Balance;Sensation;Muscle strength;Gait;Dexterity  -     Assessment Comments Brayan has now received 12 sessions of skilled PT for R LE weakness and impaired balance following a L sided CVA. Patient is now able to return to his previous exercise routine however is still attending therapy 1x/week to improve sensation in his right quad with NMES and to progress  his balance and resistance training as appropriate. He continues to present with weakness in B ankle plantar flexors during S/L heel raises but has improved from 7 reps to 12 reps. He and is unable to consistently maintain balance during SLS from more than 10 seconds and today due to fatigue was limited to ~3-4 seconds on the R LE. His tandem walking and stability on the balance board have significantly improved and he is now able to ambulate 25' in tandem without LOB and maintain stability on the board for 1 full minute. He reports 4% disability (77/80) on the LEFS and overall is pleased with his imrpovement thus far. Recommend continued skilled PT to further improve balance and strength to PLOF.   -     Please refer to paper survey for additional self-reported information Yes  -KH     Rehab Potential Good  -KH     Patient/caregiver participated in establishment of treatment plan and goals Yes  -     Patient would benefit from skilled therapy intervention Yes  -KH     PT Plan    PT Frequency 1x/week  -     Predicted Duration of Therapy Intervention (days/wks) 4 weeks  -     Planned CPT's? PT THER PROC EA 15 MIN: 02682;PT GAIT TRAINING EA 15 MIN: 75780;PT HOT OR COLD PACK TREAT MCARE;PT ELECTRICAL STIM UNATTEND: ;PT THER ACT EA 15 MIN: 24725;PT MANUAL THERAPY EA 15 MIN: 91605;PT RE-EVAL: 98793;PT NEUROMUSC RE-EDUCATION EA 15 MIN: 70327;PT SELF CARE/HOME MGMT/TRAIN EA 15: 89240;PT ULTRASOUND EA 15 MIN: 67141;PT ELECTRICAL STIM ATTD EA 15 MIN: 21323;PT AQUATIC THERAPY EA 15 MIN: 80317;PT TRACTION LUMBAR: 34055;PT TRACTION CERVICAL: 40360  -     Physical Therapy Interventions (Optional Details) balance training;home exercise program;postural re-education;stair training;strengthening;stretching;gait training;modalities;dry needling;taping;neuromuscular re-education;patient/family education  -     PT Plan Comments Continue progressing with skilled PT for neuro re-education, balance training, and daily  mobility  -       User Key  (r) = Recorded By, (t) = Taken By, (c) = Cosigned By    Initials Name Provider Type    KRANTHI To PT Physical Therapist              Modalities       03/03/17 1100          ELECTRICAL STIMULATION    Attended/Unattended Attended  -KRANTHI      Stimulation Type --   [place] current  -KH      Max mAmp 49  -KRANTHI      Location/Electrode Placement/Other R VMO/quadratus femoris  -      Rx Minutes --   12 min with 10 sec on, 10 sec off performing SAQ in supine   -KRANTHI        User Key  (r) = Recorded By, (t) = Taken By, (c) = Cosigned By    Initials Name Provider Type    KRANTHI To PT Physical Therapist              Exercises       03/03/17 1100          Subjective Comments    Subjective Comments I've already been here almost two hours working on my exercises, lifting, and running  -KRANTHI      Subjective Pain    Able to rate subjective pain? yes  -KRANTHI      Pre-Treatment Pain Level 0  -KRANTHI      Post-Treatment Pain Level 0  -KRANTHI      Exercise 1    Exercise Name 1 Refer to land flow sheet  -KRANTHI        User Key  (r) = Recorded By, (t) = Taken By, (c) = Cosigned By    Initials Name Provider Type    KRANTHI To PT Physical Therapist              Outcome Measures       03/03/17 1100          Functional Assessment    Outcome Measure Options Lower Extremity Functional Scale (LEFS)   4% disability (77/80)  -KRANTHI        User Key  (r) = Recorded By, (t) = Taken By, (c) = Cosigned By    Initials Name Provider Type    KRANTHI To PT Physical Therapist            Time Calculation:   Start Time: 1115  Stop Time: 1155  Time Calculation (min): 40 min     Therapy Charges for Today     Code Description Service Date Service Provider Modifiers Qty    69360047629 HC PT ELEC STIM EA-PER 15 MIN 3/3/2017 Latrice To, PT GP 1    81928188064 HC PT THER PROC EA 15 MIN 3/3/2017 Latrice To, PT GP 2    19915021149 HC PT MOBILITY CURRENT 3/3/2017 Latrice To, PT GP, CI 1    35602916005 HC PT MOBILITY PROJECTED 3/3/2017 Latrice  Zuleika, PT GP, CH 1          PT G-Codes  Outcome Measure Options: Lower Extremity Functional Scale (LEFS)  Score: 4% disability (77/80)  Functional Limitation: Mobility: Walking and moving around  Mobility: Walking and Moving Around Current Status (): At least 1 percent but less than 20 percent impaired, limited or restricted  Mobility: Walking and Moving Around Goal Status (): 0 percent impaired, limited or restricted         Latrice To, PT  3/3/2017

## 2017-03-04 ENCOUNTER — CLINICAL SUPPORT NO REQUIREMENTS (OUTPATIENT)
Dept: CARDIOLOGY | Facility: CLINIC | Age: 72
End: 2017-03-04

## 2017-03-06 ENCOUNTER — TELEPHONE (OUTPATIENT)
Dept: CARDIOLOGY | Facility: CLINIC | Age: 72
End: 2017-03-06

## 2017-03-06 DIAGNOSIS — E78.5 HYPERLIPIDEMIA, UNSPECIFIED HYPERLIPIDEMIA TYPE: Primary | ICD-10-CM

## 2017-03-06 RX ORDER — ATORVASTATIN CALCIUM 40 MG/1
40 TABLET, FILM COATED ORAL DAILY
Qty: 90 TABLET | Refills: 3 | Status: SHIPPED | OUTPATIENT
Start: 2017-03-06 | End: 2017-07-25

## 2017-03-06 NOTE — TELEPHONE ENCOUNTER
Pt's wife (danita) called re: BP Reading.  She has noticed that BP has increased from 130s to 140s .  Meds are up to date.  Please advise.

## 2017-03-07 NOTE — TELEPHONE ENCOUNTER
As we just increased Coreg, have him stay on a low-salt diet, increase exercise and watch his blood pressure over the next week.  She remains elevated can increase either Coreg or Lotensin but will need him to call back with a heart rate as well as blood pressure. tatum

## 2017-03-10 ENCOUNTER — APPOINTMENT (OUTPATIENT)
Dept: PHYSICAL THERAPY | Facility: HOSPITAL | Age: 72
End: 2017-03-10

## 2017-03-16 ENCOUNTER — APPOINTMENT (OUTPATIENT)
Dept: PHYSICAL THERAPY | Facility: HOSPITAL | Age: 72
End: 2017-03-16

## 2017-03-20 ENCOUNTER — TELEPHONE (OUTPATIENT)
Dept: CARDIOLOGY | Facility: CLINIC | Age: 72
End: 2017-03-20

## 2017-03-20 ENCOUNTER — DOCUMENTATION (OUTPATIENT)
Dept: PHYSICAL THERAPY | Facility: HOSPITAL | Age: 72
End: 2017-03-20

## 2017-03-20 DIAGNOSIS — R53.1 RIGHT SIDED WEAKNESS: ICD-10-CM

## 2017-03-20 DIAGNOSIS — I63.533 CEREBROVASCULAR ACCIDENT (CVA) DUE TO BILATERAL STENOSIS OF POSTERIOR CEREBRAL ARTERIES (HCC): Primary | ICD-10-CM

## 2017-03-20 NOTE — TELEPHONE ENCOUNTER
----- Message from Meghan Melendez RN sent at 3/20/2017 11:29 AM EDT -----  Regarding: FW: Visit Follow-Up Question  Contact: [phone]      ----- Message -----     From: Brayan Cintron Jr     Sent: 3/19/2017  11:39 AM       To: [facility]  Subject: Visit Follow-Up Question                         Dear Regina:    An update on Brayan's blood pressure numbers    137/75. 49    Over a 2 week period    Let me know if he needs to make any med changes.     Sonya

## 2017-03-20 NOTE — THERAPY DISCHARGE NOTE
Outpatient Physical Therapy Discharge Summary         Patient Name: Brayan Cintron Jr  : 1945  MRN: [id number]    Today's Date: 3/20/2017    Visit Dx:    ICD-10-CM ICD-9-CM   1. Cerebrovascular accident (CVA) due to bilateral stenosis of posterior cerebral arteries I63.533 434.91   2. Right sided weakness M62.81 728.87             PT OP Goals       17 1200       PT Short Term Goals    STG Date to Achieve 17  -     STG 1 Patient will report 25% improvement in picking up small objects such as house keys  -     STG 1 Progress Met  -     STG 2 Patient will increase tip pinch strength on the right hand from 10 lbs. to 15 lbs. or greater to help with everyday tasks  -     STG 2 Progress Met  -     STG 3 Patient will increase R LE SLS from 1-2 seconds to 10 seconds or great to prevent LOB/falls  -     STG 3 Progress Met  -     Long Term Goals    LTG Date to Achieve 17  -     LTG 1 Patient will perform 18 reps (or more) of full ROM S/L calf rasises on the R LE to help decrease muscle disparity between R and L ankles and to help increase ankle stability for balance   -     LTG 1 Progress Not Met  -     LTG 2 Patient will decrease level of perceived disability as measured by the LEFS from 23.7% disability (61/80) to <15% disability  -     LTG 2 Progress Met  -     LTG 3 Patient will demonstrate safety and independence with strength training at [facility]  -     LTG 3 Progress Met  [facility]       User Key  (r) = Recorded By, (t) = Taken By, (c) = Cosigned By    Initials Name Provider Type    KRANTHI To PT Physical Therapist          OP PT Discharge Summary  Date of Discharge: 17  Reason for Discharge: Maximum functional potential achieved, Independent  Outcomes Achieved: Patient able to partially acheive established goals  Discharge Destination: Home with home program      Time Calculation:                    Latrice To PT  3/20/2017

## 2017-03-22 RX ORDER — BENAZEPRIL HYDROCHLORIDE 10 MG/1
TABLET ORAL
Qty: 90 TABLET | Refills: 1 | Status: SHIPPED | OUTPATIENT
Start: 2017-03-22 | End: 2017-04-14

## 2017-03-24 ENCOUNTER — APPOINTMENT (OUTPATIENT)
Dept: PHYSICAL THERAPY | Facility: HOSPITAL | Age: 72
End: 2017-03-24

## 2017-04-07 ENCOUNTER — TELEPHONE (OUTPATIENT)
Dept: CARDIOLOGY | Facility: CLINIC | Age: 72
End: 2017-04-07

## 2017-04-07 NOTE — TELEPHONE ENCOUNTER
----- Message from Citlaly Aguilar MA sent at 4/6/2017 10:02 AM EDT -----  Regarding: FW: Visit Follow-Up Question  Contact: [phone]  I called and spoke with Sonya.  She said Mr. Cintron has been feeling fine and is not having any problems.  She just wanted to make sure you are ok with his b/p.  I informed Sonya that this would be addressed on Monday (because MD is unavailable) and to call back if his b/p becomes elevated.  Sonya said that is fine and there is no venegas.  mata  ----- Message -----     From: Meghan Melendez RN     Sent: 4/6/2017   8:30 AM       To: Regina Cuba MA  Subject: FW: Visit Follow-Up Question                         ----- Message -----     From: Brayan Cintron Jr     Sent: 4/5/2017   7:10 PM       To: Gina Kirkland [facility]  Subject: Visit Follow-Up Question                         This is Sonya  We have been monitoring Brayan's BP  When Brayan was on 10 AmLOdpine each morning and less Benazapril, his BP average was 132/75 from February 17 to March 1.   When he was last in to see Dr. Maxwell, the AmLOdpine was reduced to 5 mg daily and the benazepril  was increased. Eloquis was added.  His BP average went to 137/75 from March 3 to March 19  The benazepril was increased again since then, but BP average is 138/74 from March 20 to April 4.   So it seems the increases in Benazapril have not been effective?  Yes? no?  Just checking to see if you are okay with BP or need medication review?    Sonya

## 2017-04-12 NOTE — TELEPHONE ENCOUNTER
Too early to tell and only with one reading.  Please have her get some more readings and call me back by Friday.tatum

## 2017-04-13 ENCOUNTER — CLINICAL SUPPORT NO REQUIREMENTS (OUTPATIENT)
Dept: CARDIOLOGY | Facility: CLINIC | Age: 72
End: 2017-04-13

## 2017-04-13 NOTE — TELEPHONE ENCOUNTER
4/6 144/70 49  4/7 144/79 53  4/8   4/9  4/10 140/80 50  4/11 143/76 59  140/72 59  4/12 132/74 58    Pt has been only taking BP when he remembers.  One day he took it twice.

## 2017-04-14 RX ORDER — BENAZEPRIL HYDROCHLORIDE 40 MG/1
40 TABLET, FILM COATED ORAL 2 TIMES DAILY
Qty: 180 TABLET | Refills: 1 | Status: SHIPPED | OUTPATIENT
Start: 2017-04-14 | End: 2017-07-25

## 2017-04-16 ENCOUNTER — CLINICAL SUPPORT NO REQUIREMENTS (OUTPATIENT)
Dept: CARDIOLOGY | Facility: CLINIC | Age: 72
End: 2017-04-16

## 2017-04-16 DIAGNOSIS — I63.9 CRYPTOGENIC STROKE (HCC): Primary | ICD-10-CM

## 2017-04-16 PROCEDURE — 93298 REM INTERROG DEV EVAL SCRMS: CPT | Performed by: INTERNAL MEDICINE

## 2017-04-16 PROCEDURE — 93299 PR REM INTERROG ICPMS/SCRMS <30 D TECH REVIEW: CPT | Performed by: INTERNAL MEDICINE

## 2017-04-25 ENCOUNTER — CLINICAL SUPPORT NO REQUIREMENTS (OUTPATIENT)
Dept: CARDIOLOGY | Facility: CLINIC | Age: 72
End: 2017-04-25

## 2017-04-27 ENCOUNTER — TELEPHONE (OUTPATIENT)
Dept: CARDIOLOGY | Facility: CLINIC | Age: 72
End: 2017-04-27

## 2017-04-27 ENCOUNTER — CLINICAL SUPPORT NO REQUIREMENTS (OUTPATIENT)
Dept: CARDIOLOGY | Facility: CLINIC | Age: 72
End: 2017-04-27

## 2017-05-02 ENCOUNTER — TELEPHONE (OUTPATIENT)
Dept: CARDIOLOGY | Facility: CLINIC | Age: 72
End: 2017-05-02

## 2017-05-09 ENCOUNTER — CLINICAL SUPPORT NO REQUIREMENTS (OUTPATIENT)
Dept: CARDIOLOGY | Facility: CLINIC | Age: 72
End: 2017-05-09

## 2017-05-27 PROCEDURE — 93298 REM INTERROG DEV EVAL SCRMS: CPT | Performed by: INTERNAL MEDICINE

## 2017-05-27 PROCEDURE — 93299 PR REM INTERROG ICPMS/SCRMS <30 D TECH REVIEW: CPT | Performed by: INTERNAL MEDICINE

## 2017-05-28 ENCOUNTER — CLINICAL SUPPORT NO REQUIREMENTS (OUTPATIENT)
Dept: CARDIOLOGY | Facility: CLINIC | Age: 72
End: 2017-05-28

## 2017-05-28 DIAGNOSIS — I63.9 CRYPTOGENIC STROKE (HCC): Primary | ICD-10-CM

## 2017-05-31 ENCOUNTER — CLINICAL SUPPORT NO REQUIREMENTS (OUTPATIENT)
Dept: CARDIOLOGY | Facility: CLINIC | Age: 72
End: 2017-05-31

## 2017-06-12 ENCOUNTER — OFFICE VISIT (OUTPATIENT)
Dept: CARDIOLOGY | Facility: CLINIC | Age: 72
End: 2017-06-12

## 2017-06-12 VITALS
HEIGHT: 69 IN | SYSTOLIC BLOOD PRESSURE: 160 MMHG | HEART RATE: 50 BPM | BODY MASS INDEX: 31.1 KG/M2 | DIASTOLIC BLOOD PRESSURE: 80 MMHG | WEIGHT: 210 LBS

## 2017-06-12 DIAGNOSIS — I47.1 SVT (SUPRAVENTRICULAR TACHYCARDIA) (HCC): ICD-10-CM

## 2017-06-12 DIAGNOSIS — I48.0 PAF (PAROXYSMAL ATRIAL FIBRILLATION) (HCC): Primary | ICD-10-CM

## 2017-06-12 DIAGNOSIS — I10 ESSENTIAL HYPERTENSION: ICD-10-CM

## 2017-06-12 PROCEDURE — 99213 OFFICE O/P EST LOW 20 MIN: CPT | Performed by: INTERNAL MEDICINE

## 2017-06-12 PROCEDURE — 93000 ELECTROCARDIOGRAM COMPLETE: CPT | Performed by: INTERNAL MEDICINE

## 2017-06-12 NOTE — PROGRESS NOTES
Date of Office Visit: 2017  Encounter Provider: Melissa Maxwell MD  Place of Service: [facility] CARDIOLOGY  Patient Name: Brayan Cintron Jr  :1945    Chief complaint  Follow-up of hypertension paroxysmal supra-ventricular tachycardia, probable embolic stroke    History of Present Illness  Patient is a 71-year-old gentleman with history of hypertension, hyperlipidemia, renal insufficiency.  In  he had wide complex tachycardia and non-ST elevation myocardial infarction.  He had a stress test that did not reveal ischemia and underwent ablation of the AV node was reentry tachycardia which is felt to have mediated the myocardial infarction.  More recently in 2016 he complained of vision changes and was found to have 2 infarction the left PCA distribution.  He underwent transesophageal imaging that revealed no cord embolic source.  The loop recorder was placed and he was dismissed home on aspirin.  His Linq device checked in October had revealed no interim atrial arrhythmias with PACs have been present.  In 2016 with ongoing symptoms of right upper and lower extremity paresthesias and MRI of his head was obtained that revealed stenosis of both posterior cerebral arteries with progressive disease.  He underwent further neurologic testing and no clear etiology was discerned.  He was treated medically.  Blood pressure was elevated at the time and Coreg was increased Norvasc was decreased since he had PACs or frequent.  In 2017 he was noted to have atrial fibrillation on his Linq device.  Coreg was increased and he was switched Eliquis from aspirin and Plavix therapy after much discussion.    Since last visit he denies any chest pain, shortness of breath, palpitations syncope near syncope he is tolerating Eliquis well.  He plans to move to [place] and the next 6 weeks and his blood pressure is usually much lower though he attributes this increases in  eating out with the recent dresses with [facility].    Past Medical History:   Diagnosis Date   • Acute renal failure    • Arrhythmia    • Chronic renal insufficiency    • Colon polyp    • Edema    • Hyperlipidemia    • Hypertension    • Melanoma    • XENA (obstructive sleep apnea)    • Paroxysmal supraventricular tachycardia    • Right arm numbness    • Right facial numbness    • Right leg numbness    • Right sided weakness    • Shingles    • Squamous cell carcinoma    • Squamous cell carcinoma of skin    • Stroke    • Syncope    • Tachycardia      Past Surgical History:   Procedure Laterality Date   • CARDIAC ABLATION     • CARDIAC ELECTROPHYSIOLOGY PROCEDURE N/A 10/3/2016    Procedure: Reveal implant  LINQ;  Surgeon: Wong Zavala MD;  Location:  [facility] CATH INVASIVE LOCATION;  Service:    • CARDIAC ELECTROPHYSIOLOGY PROCEDURE Left 12/19/2016    Procedure: Loop recorder removal and reimplant  linq;  Surgeon: Wong Zavala MD;  Location: [facility] CATH INVASIVE LOCATION;  Service:    • CYST REMOVAL      off tailbone     Outpatient Medications Prior to Visit   Medication Sig Dispense Refill   • amLODIPine (NORVASC) 5 MG tablet Take 1 tablet by mouth Daily. (Patient taking differently: Take 5 mg by mouth Every Morning. 2.5mg Q PM) 90 tablet 3   • atorvastatin (LIPITOR) 40 MG tablet Take 1 tablet by mouth Daily. 90 tablet 3   • benazepril (LOTENSIN) 40 MG tablet Take 1 tablet by mouth 2 (Two) Times a Day. 180 tablet 1   • carvedilol (COREG) 12.5 MG tablet Take 1 tablet by mouth 2 (Two) Times a Day. 180 tablet 3   • cholecalciferol (VITAMIN D3) 1000 UNITS tablet Take 2,000 Units by mouth Daily.     • folic acid (FOLVITE) 1 MG tablet Take 2 mg by mouth Daily.     • terazosin (HYTRIN) 10 MG capsule Take 10 mg by mouth Every Night.     • aspirin 81 MG tablet Take 81 mg by mouth daily.     • clopidogrel (PLAVIX) 75 MG tablet Take 75 mg by mouth Daily.       No facility-administered medications prior to visit.   "    Allergies as of 06/12/2017   • (No Known Allergies)     Social History     Social History   • Marital status:      Spouse name: N/A   • Number of children: N/A   • Years of education: N/A     Occupational History   • Not on file.     Social History Main Topics   • Smoking status: Never Smoker   • Smokeless tobacco: Not on file      Comment: caffeine use   • Alcohol use Yes      Comment: very rare alcohol use   • Drug use: Yes     Special: Marijuana      Comment: on occ   • Sexual activity: Defer     Other Topics Concern   • Not on file     Social History Narrative     Family History   Problem Relation Age of Onset   • Hypertension Mother    • Heart disease Mother    • Transient ischemic attack Mother    • Diabetes Mother    • Heart attack Brother      Review of Systems   Constitution: Negative for fever, malaise/fatigue, weight gain and weight loss.   HENT: Negative for ear pain, hearing loss, nosebleeds and sore throat.    Eyes: Negative for double vision, pain, vision loss in left eye and vision loss in right eye.   Cardiovascular:        See history of present illness.   Respiratory: Negative for cough, shortness of breath, sleep disturbances due to breathing, snoring and wheezing.    Endocrine: Negative for cold intolerance, heat intolerance and polyuria.   Skin: Negative for itching, poor wound healing and rash.   Musculoskeletal: Negative for joint pain, joint swelling and myalgias.   Gastrointestinal: Negative for abdominal pain, diarrhea, hematochezia, nausea and vomiting.   Genitourinary: Negative for hematuria and hesitancy.   Neurological: Negative for numbness, paresthesias and seizures.   Psychiatric/Behavioral: Negative for depression. The patient is not nervous/anxious.      Objective:     Vitals:    06/12/17 1140   BP: 160/80   Pulse: 50   Weight: 210 lb (95.3 kg)   Height: 69\" (175.3 cm)     Body mass index is 31.01 kg/(m^2).    Physical Exam   Constitutional: He is oriented to person, " place, and time. He appears well-developed and well-nourished.   Obese   HENT:   Head: Normocephalic.   Nose: Nose normal.   Mouth/Throat: Oropharynx is clear and moist.   Eyes: Conjunctivae and EOM are normal. Pupils are equal, round, and reactive to light. Right eye exhibits no discharge. No scleral icterus.   Neck: Normal range of motion. Neck supple. No JVD present. No thyromegaly present.   Cardiovascular: Regular rhythm, normal heart sounds and intact distal pulses.  Bradycardia present.  Exam reveals no gallop and no friction rub.    No murmur heard.  Pulses:       Carotid pulses are 2+ on the right side, and 2+ on the left side.       Radial pulses are 2+ on the right side, and 2+ on the left side.        Femoral pulses are 2+ on the right side, and 2+ on the left side.       Popliteal pulses are 2+ on the right side, and 2+ on the left side.        Dorsalis pedis pulses are 2+ on the right side, and 2+ on the left side.        Posterior tibial pulses are 2+ on the right side, and 2+ on the left side.   Pulmonary/Chest: Effort normal and breath sounds normal. No respiratory distress. He has no wheezes. He has no rales.   Abdominal: Soft. Bowel sounds are normal. He exhibits no distension. There is no hepatosplenomegaly. There is no tenderness. There is no rebound.   Musculoskeletal: Normal range of motion. He exhibits no edema or tenderness.   Neurological: He is alert and oriented to person, place, and time.   Skin: Skin is warm and dry. No rash noted. No erythema.   Psychiatric: He has a normal mood and affect. His behavior is normal. Judgment and thought content normal.   Vitals reviewed.    Lab Review:     ECG 12 Lead  Date/Time: 6/12/2017 9:40 PM  Performed by: GLEN MCCARTHY  Authorized by: GLEN MCCARTHY   Comparison: compared with previous ECG   Similar to previous ECG  Rhythm: sinus rhythm  Conduction comments: Nonspecific ST T wave changes  QTc = 427msec  Clinical impression: abnormal  ECG          Assessment:       Diagnosis Plan   1. PAF (paroxysmal atrial fibrillation)     2. SVT (supraventricular tachycardia)     3. Essential hypertension       Plan:       1.  Paroxysmal atrial fibrillation.  Maintaining sinus on his current rhythm and tolerating Eliquis well.  I have given a 90 day prescription for cardiac medications so that he may have some time to find an appropriate cardiologist in [place]  2.  History of Linq placement as above  3.  History of paroxysmal supra-ventricular tachycardia, s/p ablation  4.  History of ventricular tachycardia  5.  History of stroke  6.  Renal insufficiency resolved  7.  Elevated glucose    Atrial Fibrillation and Atrial Flutter  Assessment  • The patient has paroxysmal atrial fibrillation  • This is non-valvular in etiology  • The patient's CHADS2-VASc score is 4  • A KKB5PP3-WAEi score of 2 or more is considered a high risk for a thromboembolic event  • Warfarin not prescribed  • Dabigatran not prescribed  • Rivaroxaban not prescribed  • Apixaban not prescribed  • Aspirin prescribed    Plan  • Attempt to maintain sinus rhythm  • Continue aspirin for antithrombotic therapy, bleeding issues discussed  • Add apixaban for antithrombotic therapy  • Continue beta blocker for rhythm control         Brayan Cintron Jr   Home Medication Instructions [facility]:    Printed on:06/12/17 8563   Medication Information                      amLODIPine (NORVASC) 5 MG tablet  Take 1 tablet by mouth Daily.             apixaban (ELIQUIS) 5 MG tablet tablet  Take 5 mg by mouth 2 (Two) Times a Day.             atorvastatin (LIPITOR) 40 MG tablet  Take 1 tablet by mouth Daily.             benazepril (LOTENSIN) 40 MG tablet  Take 1 tablet by mouth 2 (Two) Times a Day.             carvedilol (COREG) 12.5 MG tablet  Take 1 tablet by mouth 2 (Two) Times a Day.             cholecalciferol (VITAMIN D3) 1000 UNITS tablet  Take 2,000 Units by mouth Daily.             folic acid (FOLVITE) 1 MG  tablet  Take 2 mg by mouth Daily.             terazosin (HYTRIN) 10 MG capsule  Take 10 mg by mouth Every Night.               Dictated utilizing Dragon dictation

## 2017-07-15 ENCOUNTER — CLINICAL SUPPORT NO REQUIREMENTS (OUTPATIENT)
Dept: CARDIOLOGY | Facility: CLINIC | Age: 72
End: 2017-07-15

## 2017-07-15 DIAGNOSIS — I63.9 CRYPTOGENIC STROKE (HCC): Primary | ICD-10-CM

## 2017-07-15 PROCEDURE — 93298 REM INTERROG DEV EVAL SCRMS: CPT | Performed by: INTERNAL MEDICINE

## 2017-07-15 PROCEDURE — 93299 PR REM INTERROG ICPMS/SCRMS <30 D TECH REVIEW: CPT | Performed by: INTERNAL MEDICINE

## 2017-07-17 ENCOUNTER — CLINICAL SUPPORT NO REQUIREMENTS (OUTPATIENT)
Dept: CARDIOLOGY | Facility: CLINIC | Age: 72
End: 2017-07-17

## 2017-07-19 ENCOUNTER — OFFICE VISIT (OUTPATIENT)
Dept: CARDIOLOGY | Facility: CLINIC | Age: 72
End: 2017-07-19

## 2017-07-19 ENCOUNTER — LAB (OUTPATIENT)
Dept: LAB | Facility: HOSPITAL | Age: 72
End: 2017-07-19

## 2017-07-19 VITALS
HEART RATE: 51 BPM | SYSTOLIC BLOOD PRESSURE: 120 MMHG | HEIGHT: 69 IN | WEIGHT: 209 LBS | DIASTOLIC BLOOD PRESSURE: 70 MMHG | BODY MASS INDEX: 30.96 KG/M2

## 2017-07-19 DIAGNOSIS — I47.20 VENTRICULAR TACHYCARDIA (HCC): Primary | ICD-10-CM

## 2017-07-19 DIAGNOSIS — R94.31 ABNORMAL ELECTROCARDIOGRAM: ICD-10-CM

## 2017-07-19 DIAGNOSIS — R94.31 ABNORMAL ECG: ICD-10-CM

## 2017-07-19 DIAGNOSIS — I48.0 PAF (PAROXYSMAL ATRIAL FIBRILLATION) (HCC): ICD-10-CM

## 2017-07-19 DIAGNOSIS — I47.20 VENTRICULAR TACHYCARDIA (HCC): ICD-10-CM

## 2017-07-19 DIAGNOSIS — I47.1 SVT (SUPRAVENTRICULAR TACHYCARDIA) (HCC): ICD-10-CM

## 2017-07-19 PROBLEM — I47.9 PAROXYSMAL TACHYCARDIA (HCC): Status: ACTIVE | Noted: 2017-07-19

## 2017-07-19 LAB
ANION GAP SERPL CALCULATED.3IONS-SCNC: 12.1 MMOL/L
BUN BLD-MCNC: 24 MG/DL (ref 8–23)
BUN/CREAT SERPL: 18.3 (ref 7–25)
CALCIUM SPEC-SCNC: 9.7 MG/DL (ref 8.6–10.5)
CHLORIDE SERPL-SCNC: 102 MMOL/L (ref 98–107)
CO2 SERPL-SCNC: 24.9 MMOL/L (ref 22–29)
CREAT BLD-MCNC: 1.31 MG/DL (ref 0.76–1.27)
GFR SERPL CREATININE-BSD FRML MDRD: 54 ML/MIN/1.73
GLUCOSE BLD-MCNC: 97 MG/DL (ref 65–99)
MAGNESIUM SERPL-MCNC: 2.3 MG/DL (ref 1.6–2.4)
POTASSIUM BLD-SCNC: 4.1 MMOL/L (ref 3.5–5.2)
SODIUM BLD-SCNC: 139 MMOL/L (ref 136–145)

## 2017-07-19 PROCEDURE — 99214 OFFICE O/P EST MOD 30 MIN: CPT | Performed by: NURSE PRACTITIONER

## 2017-07-19 PROCEDURE — 80048 BASIC METABOLIC PNL TOTAL CA: CPT

## 2017-07-19 PROCEDURE — 36415 COLL VENOUS BLD VENIPUNCTURE: CPT

## 2017-07-19 PROCEDURE — 83735 ASSAY OF MAGNESIUM: CPT

## 2017-07-19 RX ORDER — LATANOPROST 50 UG/ML
1 SOLUTION/ DROPS OPHTHALMIC NIGHTLY
COMMUNITY

## 2017-07-20 ENCOUNTER — HOSPITAL ENCOUNTER (OUTPATIENT)
Dept: CARDIOLOGY | Facility: HOSPITAL | Age: 72
Discharge: HOME | End: 2017-07-20
Admitting: NURSE PRACTITIONER

## 2017-07-20 VITALS — WEIGHT: 207 LBS | HEIGHT: 70 IN | BODY MASS INDEX: 29.63 KG/M2

## 2017-07-20 DIAGNOSIS — I47.20 VENTRICULAR TACHYCARDIA (HCC): ICD-10-CM

## 2017-07-20 DIAGNOSIS — R94.31 ABNORMAL ELECTROCARDIOGRAM: ICD-10-CM

## 2017-07-20 LAB
BH CV ECHO MEAS - ACS: 1.6 CM
BH CV ECHO MEAS - AO MAX PG (FULL): 6.9 MMHG
BH CV ECHO MEAS - AO MAX PG: 9.1 MMHG
BH CV ECHO MEAS - AO MEAN PG (FULL): 3.7 MMHG
BH CV ECHO MEAS - AO MEAN PG: 5.1 MMHG
BH CV ECHO MEAS - AO ROOT AREA (BSA CORRECTED): 1.4
BH CV ECHO MEAS - AO ROOT AREA: 6.9 CM^2
BH CV ECHO MEAS - AO ROOT DIAM: 3 CM
BH CV ECHO MEAS - AO V2 MAX: 150.8 CM/SEC
BH CV ECHO MEAS - AO V2 MEAN: 105.5 CM/SEC
BH CV ECHO MEAS - AO V2 VTI: 34.2 CM
BH CV ECHO MEAS - AVA(I,A): 1.8 CM^2
BH CV ECHO MEAS - AVA(I,D): 1.8 CM^2
BH CV ECHO MEAS - AVA(V,A): 1.7 CM^2
BH CV ECHO MEAS - AVA(V,D): 1.7 CM^2
BH CV ECHO MEAS - BSA(HAYCOCK): 2.2 M^2
BH CV ECHO MEAS - BSA: 2.1 M^2
BH CV ECHO MEAS - BZI_BMI: 29.7 KILOGRAMS/M^2
BH CV ECHO MEAS - BZI_METRIC_HEIGHT: 177.8 CM
BH CV ECHO MEAS - BZI_METRIC_WEIGHT: 93.9 KG
BH CV ECHO MEAS - CONTRAST EF (2CH): 51.9 ML/M^2
BH CV ECHO MEAS - CONTRAST EF 4CH: 57.8 ML/M^2
BH CV ECHO MEAS - EDV(MOD-SP2): 77 ML
BH CV ECHO MEAS - EDV(MOD-SP4): 83 ML
BH CV ECHO MEAS - EDV(TEICH): 99.2 ML
BH CV ECHO MEAS - EF(CUBED): 75.8 %
BH CV ECHO MEAS - EF(MOD-SP2): 51.9 %
BH CV ECHO MEAS - EF(MOD-SP4): 57.8 %
BH CV ECHO MEAS - EF(TEICH): 67.9 %
BH CV ECHO MEAS - ESV(MOD-SP2): 37 ML
BH CV ECHO MEAS - ESV(MOD-SP4): 35 ML
BH CV ECHO MEAS - ESV(TEICH): 31.9 ML
BH CV ECHO MEAS - FS: 37.7 %
BH CV ECHO MEAS - IVS/LVPW: 0.97
BH CV ECHO MEAS - IVSD: 1.3 CM
BH CV ECHO MEAS - LAT PEAK E' VEL: 8 CM/SEC
BH CV ECHO MEAS - LV DIASTOLIC VOL/BSA (35-75): 39.2 ML/M^2
BH CV ECHO MEAS - LV MASS(C)D: 238.9 GRAMS
BH CV ECHO MEAS - LV MASS(C)DI: 112.8 GRAMS/M^2
BH CV ECHO MEAS - LV MAX PG: 2.2 MMHG
BH CV ECHO MEAS - LV MEAN PG: 1.4 MMHG
BH CV ECHO MEAS - LV SYSTOLIC VOL/BSA (12-30): 16.5 ML/M^2
BH CV ECHO MEAS - LV V1 MAX: 74.7 CM/SEC
BH CV ECHO MEAS - LV V1 MEAN: 56.1 CM/SEC
BH CV ECHO MEAS - LV V1 VTI: 18.2 CM
BH CV ECHO MEAS - LVIDD: 4.6 CM
BH CV ECHO MEAS - LVIDS: 2.9 CM
BH CV ECHO MEAS - LVLD AP2: 7.7 CM
BH CV ECHO MEAS - LVLD AP4: 6.9 CM
BH CV ECHO MEAS - LVLS AP2: 6.9 CM
BH CV ECHO MEAS - LVLS AP4: 6.1 CM
BH CV ECHO MEAS - LVOT AREA (M): 3.5 CM^2
BH CV ECHO MEAS - LVOT AREA: 3.4 CM^2
BH CV ECHO MEAS - LVOT DIAM: 2.1 CM
BH CV ECHO MEAS - LVPWD: 1.3 CM
BH CV ECHO MEAS - MED PEAK E' VEL: 7 CM/SEC
BH CV ECHO MEAS - MV A DUR: 0.15 SEC
BH CV ECHO MEAS - MV A MAX VEL: 80 CM/SEC
BH CV ECHO MEAS - MV DEC SLOPE: 233.4 CM/SEC^2
BH CV ECHO MEAS - MV DEC TIME: 0.26 SEC
BH CV ECHO MEAS - MV E MAX VEL: 58.7 CM/SEC
BH CV ECHO MEAS - MV E/A: 0.73
BH CV ECHO MEAS - MV MAX PG: 2.7 MMHG
BH CV ECHO MEAS - MV MEAN PG: 1.2 MMHG
BH CV ECHO MEAS - MV P1/2T MAX VEL: 60.6 CM/SEC
BH CV ECHO MEAS - MV P1/2T: 76.1 MSEC
BH CV ECHO MEAS - MV V2 MAX: 81.7 CM/SEC
BH CV ECHO MEAS - MV V2 MEAN: 51.9 CM/SEC
BH CV ECHO MEAS - MV V2 VTI: 35.8 CM
BH CV ECHO MEAS - MVA P1/2T LCG: 3.6 CM^2
BH CV ECHO MEAS - MVA(P1/2T): 2.9 CM^2
BH CV ECHO MEAS - MVA(VTI): 1.7 CM^2
BH CV ECHO MEAS - PA MAX PG (FULL): 3 MMHG
BH CV ECHO MEAS - PA MAX PG: 3.7 MMHG
BH CV ECHO MEAS - PA V2 MAX: 95.6 CM/SEC
BH CV ECHO MEAS - PULM A REVS DUR: 0.13 SEC
BH CV ECHO MEAS - PULM A REVS VEL: 27.6 CM/SEC
BH CV ECHO MEAS - PULM DIAS VEL: 44.6 CM/SEC
BH CV ECHO MEAS - PULM S/D: 1.6
BH CV ECHO MEAS - PULM SYS VEL: 71.3 CM/SEC
BH CV ECHO MEAS - PVA(V,A): 1.8 CM^2
BH CV ECHO MEAS - PVA(V,D): 1.8 CM^2
BH CV ECHO MEAS - QP/QS: 0.74
BH CV ECHO MEAS - RAP SYSTOLE: 3 MMHG
BH CV ECHO MEAS - RV MAX PG: 0.7 MMHG
BH CV ECHO MEAS - RV MEAN PG: 0.49 MMHG
BH CV ECHO MEAS - RV V1 MAX: 41.7 CM/SEC
BH CV ECHO MEAS - RV V1 MEAN: 33.7 CM/SEC
BH CV ECHO MEAS - RV V1 VTI: 10.8 CM
BH CV ECHO MEAS - RVOT AREA: 4.2 CM^2
BH CV ECHO MEAS - RVOT DIAM: 2.3 CM
BH CV ECHO MEAS - RVSP: 34 MMHG
BH CV ECHO MEAS - SI(AO): 111.9 ML/M^2
BH CV ECHO MEAS - SI(CUBED): 35.7 ML/M^2
BH CV ECHO MEAS - SI(LVOT): 29.1 ML/M^2
BH CV ECHO MEAS - SI(MOD-SP2): 18.9 ML/M^2
BH CV ECHO MEAS - SI(MOD-SP4): 22.7 ML/M^2
BH CV ECHO MEAS - SI(TEICH): 31.8 ML/M^2
BH CV ECHO MEAS - SUP REN AO DIAM: 1.9 CM
BH CV ECHO MEAS - SV(AO): 237 ML
BH CV ECHO MEAS - SV(CUBED): 75.7 ML
BH CV ECHO MEAS - SV(LVOT): 61.7 ML
BH CV ECHO MEAS - SV(MOD-SP2): 40 ML
BH CV ECHO MEAS - SV(MOD-SP4): 48 ML
BH CV ECHO MEAS - SV(RVOT): 45.6 ML
BH CV ECHO MEAS - SV(TEICH): 67.3 ML
BH CV ECHO MEAS - TAPSE (>1.6): 1.7 CM2
BH CV ECHO MEAS - TR MAX VEL: 279.6 CM/SEC
BH CV NUCLEAR PRIOR STUDY: 2
BH CV STRESS BP STAGE 1: NORMAL
BH CV STRESS COMMENTS STAGE 1: NORMAL
BH CV STRESS DOSE REGADENOSON STAGE 1: 0.4
BH CV STRESS DURATION MIN STAGE 1: 0
BH CV STRESS DURATION SEC STAGE 1: 15
BH CV STRESS HR STAGE 1: 66
BH CV STRESS PROTOCOL 1: NORMAL
BH CV STRESS RECOVERY BP: NORMAL MMHG
BH CV STRESS RECOVERY HR: 54 BPM
BH CV STRESS STAGE 1: 1
BH CV XLRA - RV BASE: 3.5 CM
BH CV XLRA - TDI S': 12 CM/SEC
E/E' RATIO: 8
LEFT ATRIUM VOLUME INDEX: 39 ML/M2
LEFT ATRIUM VOLUME: 81 CM3
LV EF NUC BP: 53 %
MAXIMAL PREDICTED HEART RATE: 151 BPM
PERCENT MAX PREDICTED HR: 43.71 %
STRESS BASELINE BP: NORMAL MMHG
STRESS BASELINE HR: 51 BPM
STRESS PERCENT HR: 51 %
STRESS POST EXERCISE DUR SEC: 15 SEC
STRESS POST PEAK BP: NORMAL MMHG
STRESS POST PEAK HR: 66 BPM
STRESS TARGET HR: 128 BPM

## 2017-07-20 PROCEDURE — 78452 HT MUSCLE IMAGE SPECT MULT: CPT

## 2017-07-20 PROCEDURE — 78452 HT MUSCLE IMAGE SPECT MULT: CPT | Performed by: INTERNAL MEDICINE

## 2017-07-20 PROCEDURE — 93306 TTE W/DOPPLER COMPLETE: CPT

## 2017-07-20 PROCEDURE — 93016 CV STRESS TEST SUPVJ ONLY: CPT | Performed by: INTERNAL MEDICINE

## 2017-07-20 PROCEDURE — 93017 CV STRESS TEST TRACING ONLY: CPT

## 2017-07-20 PROCEDURE — 93018 CV STRESS TEST I&R ONLY: CPT | Performed by: INTERNAL MEDICINE

## 2017-07-20 PROCEDURE — A9502 TC99M TETROFOSMIN: HCPCS | Performed by: NURSE PRACTITIONER

## 2017-07-20 PROCEDURE — 93306 TTE W/DOPPLER COMPLETE: CPT | Performed by: INTERNAL MEDICINE

## 2017-07-20 PROCEDURE — 0 TECHNETIUM TETROFOSMIN KIT: Performed by: NURSE PRACTITIONER

## 2017-07-20 PROCEDURE — 25010000002 REGADENOSON 0.4 MG/5ML SOLUTION: Performed by: NURSE PRACTITIONER

## 2017-07-20 RX ADMIN — TETROFOSMIN 1 DOSE: 1.38 INJECTION, POWDER, LYOPHILIZED, FOR SOLUTION INTRAVENOUS at 08:34

## 2017-07-20 RX ADMIN — TETROFOSMIN 1 DOSE: 1.38 INJECTION, POWDER, LYOPHILIZED, FOR SOLUTION INTRAVENOUS at 07:45

## 2017-07-20 RX ADMIN — REGADENOSON 0.4 MG: 0.08 INJECTION, SOLUTION INTRAVENOUS at 08:34

## 2017-07-21 ENCOUNTER — TELEPHONE (OUTPATIENT)
Dept: CARDIOLOGY | Facility: CLINIC | Age: 72
End: 2017-07-21

## 2017-07-21 DIAGNOSIS — I47.20 VENTRICULAR TACHYCARDIA (HCC): ICD-10-CM

## 2017-07-21 DIAGNOSIS — I47.1 SVT (SUPRAVENTRICULAR TACHYCARDIA) (HCC): Primary | ICD-10-CM

## 2017-07-21 PROCEDURE — 93000 ELECTROCARDIOGRAM COMPLETE: CPT | Performed by: NURSE PRACTITIONER

## 2017-07-21 NOTE — TELEPHONE ENCOUNTER
Echocardiogram and stress test noted.  Echo shows a normal EF with moderate concentric hypertrophy.  Dr. Maxwell reviewed the echocardiogram and no evidence of outflow tract obstruction.  Stress test shows no evidence of ischemia.  Magnesium and electrolytes are normal.    Patient has had wide-complex SVT in the past.  Dr. Maxwell would like the patient to see Dr. Ramirez to review her rhythm strips to make sure this was not VT.

## 2017-07-21 NOTE — PROGRESS NOTES
Date of Office Visit: 2017  Encounter Provider: YANICK Giles  Place of Service: [facility] CARDIOLOGY  Patient Name: Brayan Cintron Jr  :1945    Chief Complaint   Patient presents with   • Rapid Heart Rate   :     HPI: Brayan Cintron Jr is a 71 y.o. male comes in today for abnormal LINQ report. He is a patient of Dr. Maxwell and I am seeing him for the first time today.     He has a history of hypertension, paroxysmal SVT and a possible embolic stroke.  , he had a wide complex tachycardia and non-ST elevation myocardial infarction.  He had a stress test that did not reveal ischemia and underwent ablation of the AV node was reentry tachycardia which is felt to have mediated the myocardial infarction.  More recently in 2016 he complained of vision changes and was found to have 2 infarction the left PCA distribution.  He underwent transesophageal imaging that revealed no cord embolic source.  The loop recorder was placed and he was dismissed home on aspirin.  His Linq device checked in October had revealed no interim atrial arrhythmias with PACs have been present.  In 2016 with ongoing symptoms of right upper and lower extremity paresthesias and MRI of his head was obtained that revealed stenosis of both posterior cerebral arteries with progressive disease.  He underwent further neurologic testing and no clear etiology was discerned.  He was treated medically.  Blood pressure was elevated at the time and Coreg was increased Norvasc was decreased since he had PACs or frequent.  In 2017 he was noted to have atrial fibrillation on his Linq device.  Coreg was increased and he was switched Eliquis from aspirin and Plavix therapy after much discussion.    Follow-up with Dr. Maxwell on 2017 and was doing well at that time.    We received a Linq interrogation showing a wide complex tachycardia.  The patient was asked to come in for  assessment.    Today, he comes in and reports that on the date of the report from July 6, 2017, he had no symptoms that he recalls.  The event happened at 245 and he was likely swimming.  Denies any palpitations or tachycardia, shortness breath, edema, lightheadedness, chest pain, fatigue, orthopnea or PND.    Past Medical History:   Diagnosis Date   • Acute renal failure    • Arrhythmia    • Chronic renal insufficiency    • Colon polyp    • Edema    • Hyperlipidemia    • Hypertension    • Melanoma    • XENA (obstructive sleep apnea)    • Paroxysmal supraventricular tachycardia    • Right arm numbness    • Right facial numbness    • Right leg numbness    • Right sided weakness    • Shingles    • Squamous cell carcinoma    • Squamous cell carcinoma of skin    • Stroke    • Syncope    • Tachycardia        Past Surgical History:   Procedure Laterality Date   • CARDIAC ABLATION     • CARDIAC ELECTROPHYSIOLOGY PROCEDURE N/A 10/3/2016    Procedure: Reveal implant  LINQ;  Surgeon: Wong Zavala MD;  Location: [facility] CATH INVASIVE LOCATION;  Service:    • CARDIAC ELECTROPHYSIOLOGY PROCEDURE Left 12/19/2016    Procedure: Loop recorder removal and reimplant  linq;  Surgeon: Wong Zavala MD;  Location: [facility] CATH INVASIVE LOCATION;  Service:    • CYST REMOVAL      off tailbone           Review of Systems   Constitution: Negative for fever and malaise/fatigue.   HENT: Negative for ear pain, hearing loss, nosebleeds and sore throat.    Eyes: Negative for double vision, pain, vision loss in left eye, vision loss in right eye and visual disturbance.   Cardiovascular: Negative for claudication and leg swelling.   Respiratory: Negative for cough, snoring and wheezing.    Endocrine: Negative for cold intolerance, heat intolerance and polyuria.   Skin: Negative for color change, itching and rash.   Musculoskeletal: Negative for joint pain, joint swelling and muscle cramps.   Gastrointestinal: Negative for abdominal pain,  "diarrhea, melena, nausea and vomiting.   Genitourinary: Negative for bladder incontinence and hematuria.   Neurological: Negative for excessive daytime sleepiness, dizziness, light-headedness, paresthesias and seizures.   Psychiatric/Behavioral: Negative for depression. The patient is not nervous/anxious.    All other systems reviewed and are negative.    All other systems reviewed and are negative    No Known Allergies    All aspects of family and social history reviewed.          Objective:     Vitals:    07/19/17 1527   BP: 120/70   BP Location: Left arm   Pulse: 51   Weight: 209 lb (94.8 kg)   Height: 69\" (175.3 cm)     Body mass index is 30.86 kg/(m^2).    PHYSICAL EXAM:  Physical Exam   Constitutional: He is oriented to person, place, and time. He appears well-developed and well-nourished.   Obese   HENT:   Head: Normocephalic.   Nose: Nose normal.   Mouth/Throat: Oropharynx is clear and moist.   Eyes: Conjunctivae and EOM are normal. Pupils are equal, round, and reactive to light. Right eye exhibits no discharge. No scleral icterus.   Neck: Normal range of motion. Neck supple. No JVD present. No thyromegaly present.   Cardiovascular: Regular rhythm, normal heart sounds and intact distal pulses.  Bradycardia present.  Exam reveals no gallop and no friction rub.    No murmur heard.  Pulses:       Carotid pulses are 2+ on the right side, and 2+ on the left side.       Radial pulses are 2+ on the right side, and 2+ on the left side.        Femoral pulses are 2+ on the right side, and 2+ on the left side.       Popliteal pulses are 2+ on the right side, and 2+ on the left side.        Dorsalis pedis pulses are 2+ on the right side, and 2+ on the left side.        Posterior tibial pulses are 2+ on the right side, and 2+ on the left side.   Pulmonary/Chest: Effort normal and breath sounds normal. No respiratory distress. He has no wheezes. He has no rales.   Abdominal: Soft. Bowel sounds are normal. He exhibits no " distension. There is no hepatosplenomegaly. There is no tenderness. There is no rebound.   Musculoskeletal: Normal range of motion. He exhibits no edema or tenderness.   Neurological: He is alert and oriented to person, place, and time.   Skin: Skin is warm and dry. No rash noted. No erythema.   Psychiatric: He has a normal mood and affect. His behavior is normal. Judgment and thought content normal.   Vitals reviewed.        ECG 12 Lead  Date/Time: 7/21/2017 2:57 PM  Performed by: JEAN-PIERRE GARCIA  Authorized by: JEAN-PIERRE GARCIA   Rhythm: sinus rhythm  Rate: bradycardic  BPM: 51  Conduction: conduction normal  ST Segments: ST segments normal  T Waves: T waves normal  QRS axis: normal  Other: no other findings  Clinical impression: normal ECG  Comments: Indication: bradycardia, VT                  Assessment:       Diagnosis Plan   1. Ventricular tachycardia  Adult Transthoracic Echo Complete    Stress Test With Myocardial Perfusion    Basic Metabolic Panel    Magnesium   2. Abnormal electrocardiogram   Stress Test With Myocardial Perfusion   3. Abnormal ECG     4. PAF (paroxysmal atrial fibrillation)     5. SVT (supraventricular tachycardia)          Orders Placed This Encounter   Procedures   • Basic Metabolic Panel     Standing Status:   Future     Number of Occurrences:   1     Standing Expiration Date:   7/19/2018   • Magnesium     Standing Status:   Future     Number of Occurrences:   1     Standing Expiration Date:   7/19/2018   • Stress Test With Myocardial Perfusion     Standing Status:   Future     Number of Occurrences:   1     Standing Expiration Date:   7/19/2018     Order Specific Question:   What stress agent will be used?     Answer:   Regadenoson (Lexiscan)     Order Specific Question:   Difficulty walking criteria?     Answer:   AFib/VTach     Order Specific Question:   Reason for exam?     Answer:   Arrhythmia     Order Specific Question:   Arrhythmia(s)?     Answer:   VTach/VFib   • ECG 12  Lead     This order was created via procedure documentation   • Adult Transthoracic Echo Complete     Standing Status:   Future     Number of Occurrences:   1     Order Specific Question:   Reason for exam?     Answer:   Arrhythmia     Order Specific Question:   Arrhythmias specification?     Answer:   VTach/VFib       Current Outpatient Prescriptions   Medication Sig Dispense Refill   • amLODIPine (NORVASC) 5 MG tablet Take 1 tablet by mouth Daily. (Patient taking differently: Take 5 mg by mouth Every Morning. 2.5mg Q PM) 90 tablet 3   • apixaban (ELIQUIS) 5 MG tablet tablet Take 5 mg by mouth 2 (Two) Times a Day.     • atorvastatin (LIPITOR) 40 MG tablet Take 1 tablet by mouth Daily. 90 tablet 3   • benazepril (LOTENSIN) 40 MG tablet Take 1 tablet by mouth 2 (Two) Times a Day. 180 tablet 1   • carvedilol (COREG) 12.5 MG tablet Take 1 tablet by mouth 2 (Two) Times a Day. 180 tablet 3   • cholecalciferol (VITAMIN D3) 1000 UNITS tablet Take 2,000 Units by mouth Daily.     • folic acid (FOLVITE) 1 MG tablet Take 2 mg by mouth Daily.     • latanoprost (XALATAN) 0.005 % ophthalmic solution Administer 1 drop to both eyes Every Night.     • terazosin (HYTRIN) 10 MG capsule Take 10 mg by mouth Every Night.       No current facility-administered medications for this visit.             Plan:       1.Ventricular tachycardia-patient had what appears to be ventricular tachycardia on a sling device.I am going to set him up for echocardiogram and nuclear stress test.  Unable to increase carvedilol due to bradycardia.  His SVT in the past was wide complex of this may be SVT.  Depending on his test results and may need him to see Dr. Ramirez again although he was asymptomatic.  Dr. Zavala did review the strip and thinks it could be torsades.  Will check electrolytes.  2. Atrial Fibrillation and Atrial Flutter  Assessment  • The patient has paroxysmal atrial fibrillation  • This is non-valvular in etiology  • The patient's  CHADS2-VASc score is 4  • A ABE1GH3-HHLu score of 2 or more is considered a high risk for a thromboembolic event  • Warfarin not prescribed  • Dabigatran not prescribed  • Rivaroxaban not prescribed  • Apixaban not prescribed  • Aspirin prescribed    Plan  • Attempt to maintain sinus rhythm  • Continue aspirin for antithrombotic therapy, bleeding issues discussed  • Add apixaban for antithrombotic therapy  • Continue beta blocker for rhythm control    Follow up in office to be determined after testing.    As always, it has been a pleasure to participate in this patient's care.      Sincerely,      YANICK Giles

## 2017-07-21 NOTE — TELEPHONE ENCOUNTER
Discussed with wife about test results. They are moving to [place] next week. They are going to establish with a cardiologist and take records. She preferred to appt with DR. Ramirez as they will be gone starting Mondayda

## 2017-07-21 NOTE — TELEPHONE ENCOUNTER
10:44  Sonya, pt's wife, returned your call - she will be available all day to take msg and fwd to patient.  [phone]/erasmo    11:25  Pt's wife stopped by the ofc - asked for results -  I told her Miriam is in clinic with patient and will call later in the day. --Wife states if any new Rx or med change - she will have to  [facility] as [facility] are going out of town/erasmo

## 2017-07-24 ENCOUNTER — TRANSCRIBE ORDERS (OUTPATIENT)
Dept: CARDIOLOGY | Facility: CLINIC | Age: 72
End: 2017-07-24

## 2017-07-24 ENCOUNTER — LAB (OUTPATIENT)
Dept: LAB | Facility: HOSPITAL | Age: 72
End: 2017-07-24

## 2017-07-24 DIAGNOSIS — I47.20 VENTRICULAR TACHYARRHYTHMIA (HCC): ICD-10-CM

## 2017-07-24 DIAGNOSIS — I47.29 POLYMORPHIC VENTRICULAR TACHYCARDIA (HCC): Primary | ICD-10-CM

## 2017-07-24 DIAGNOSIS — I47.20 VENTRICULAR TACHYARRHYTHMIA (HCC): Primary | ICD-10-CM

## 2017-07-24 LAB
ANION GAP SERPL CALCULATED.3IONS-SCNC: 11.1 MMOL/L
BUN BLD-MCNC: 27 MG/DL (ref 8–23)
BUN/CREAT SERPL: 16.7 (ref 7–25)
CALCIUM SPEC-SCNC: 9.1 MG/DL (ref 8.6–10.5)
CHLORIDE SERPL-SCNC: 105 MMOL/L (ref 98–107)
CO2 SERPL-SCNC: 25.9 MMOL/L (ref 22–29)
CREAT BLD-MCNC: 1.62 MG/DL (ref 0.76–1.27)
GFR SERPL CREATININE-BSD FRML MDRD: 42 ML/MIN/1.73
GLUCOSE BLD-MCNC: 106 MG/DL (ref 65–99)
MAGNESIUM SERPL-MCNC: 2.2 MG/DL (ref 1.6–2.4)
POTASSIUM BLD-SCNC: 4.4 MMOL/L (ref 3.5–5.2)
SODIUM BLD-SCNC: 142 MMOL/L (ref 136–145)

## 2017-07-24 PROCEDURE — 36415 COLL VENOUS BLD VENIPUNCTURE: CPT

## 2017-07-24 PROCEDURE — 83735 ASSAY OF MAGNESIUM: CPT

## 2017-07-24 PROCEDURE — 80048 BASIC METABOLIC PNL TOTAL CA: CPT

## 2017-07-24 NOTE — TELEPHONE ENCOUNTER
Lilibeth, I placed this report on your desk - please have Dr. DAILY review.  He does not have an appt with him yet./erasmo

## 2017-07-24 NOTE — TELEPHONE ENCOUNTER
Talk to patient and wife regarding findings and recommended cardiac catheterization including risks and benefits of cardiac catheterization.  His creatinine last week was slightly elevated.  I asked him to increase hydration and will need this rechecked prior to and following catheter.  He understands and wishes to proceed.    Lilibeth, please arrange for the next 2-3 days.  They wish to travel out of town for move to [place] and about 5 days.  We'll need his creatinine checked and I will need to review this before we proceed in addition we'll need it checked several days later.  Also have him hold lisinopril the day of the test.  He is also on eliquis. tatum

## 2017-07-25 PROBLEM — I47.29 POLYMORPHIC VENTRICULAR TACHYCARDIA (HCC): Status: ACTIVE | Noted: 2017-07-25

## 2017-07-25 RX ORDER — ATORVASTATIN CALCIUM 40 MG/1
40 TABLET, FILM COATED ORAL DAILY
COMMUNITY

## 2017-07-25 RX ORDER — BENAZEPRIL HYDROCHLORIDE 40 MG/1
40 TABLET, FILM COATED ORAL 2 TIMES DAILY
COMMUNITY
End: 2017-07-25 | Stop reason: ALTCHOICE

## 2017-07-25 RX ORDER — AMLODIPINE BESYLATE 10 MG/1
10 TABLET ORAL DAILY
COMMUNITY
End: 2017-07-25 | Stop reason: SDUPTHER

## 2017-07-25 RX ORDER — CARVEDILOL 12.5 MG/1
12.5 TABLET ORAL 2 TIMES DAILY WITH MEALS
COMMUNITY

## 2017-07-25 NOTE — PERIOPERATIVE NURSING NOTE
CALLED AND SPOKE WITH PT AND PT WIFE CONFIRMING ARRIVAL TIME IN THE AM. PT UPSET STATING HE THOUGHT HIS APPOINTMENT WAS WITH DR. NGUYEN AND UNDER THE IMPRESSION THAT HE WAS DOING AN ELECTRICAL PROCEDURE. DISCUSSED WITH PT AND WIFE HEART CATH PROCEDURE WITH VOICED UNDERSTANDING. REMINDED TO BE NPO AFTER MN AND MAY TAKE AM MEDS WITH A SIP OF WATER. LAST DOSE OF ELIQUIS Monday AM 7/24. WIFE TO ACCOMPANY PT TO HOSPITAL IN THE AM

## 2017-07-26 ENCOUNTER — HOSPITAL ENCOUNTER (OUTPATIENT)
Facility: HOSPITAL | Age: 72
Discharge: HOME | End: 2017-07-27
Attending: INTERNAL MEDICINE | Admitting: INTERNAL MEDICINE

## 2017-07-26 DIAGNOSIS — I47.29 POLYMORPHIC VENTRICULAR TACHYCARDIA (HCC): ICD-10-CM

## 2017-07-26 LAB
BASOPHILS # BLD AUTO: 0.06 10*3/MM3 (ref 0–0.2)
BASOPHILS NFR BLD AUTO: 0.9 % (ref 0–1.5)
DEPRECATED RDW RBC AUTO: 41 FL (ref 37–54)
EOSINOPHIL # BLD AUTO: 0.36 10*3/MM3 (ref 0–0.7)
EOSINOPHIL NFR BLD AUTO: 5.3 % (ref 0.3–6.2)
ERYTHROCYTE [DISTWIDTH] IN BLOOD BY AUTOMATED COUNT: 12.6 % (ref 11.5–14.5)
HCT VFR BLD AUTO: 41.3 % (ref 40.4–52.2)
HGB BLD-MCNC: 13.8 G/DL (ref 13.7–17.6)
IMM GRANULOCYTES # BLD: 0 10*3/MM3 (ref 0–0.03)
IMM GRANULOCYTES NFR BLD: 0 % (ref 0–0.5)
LYMPHOCYTES # BLD AUTO: 1.81 10*3/MM3 (ref 0.9–4.8)
LYMPHOCYTES NFR BLD AUTO: 26.6 % (ref 19.6–45.3)
MCH RBC QN AUTO: 30 PG (ref 27–32.7)
MCHC RBC AUTO-ENTMCNC: 33.4 G/DL (ref 32.6–36.4)
MCV RBC AUTO: 89.8 FL (ref 79.8–96.2)
MONOCYTES # BLD AUTO: 0.57 10*3/MM3 (ref 0.2–1.2)
MONOCYTES NFR BLD AUTO: 8.4 % (ref 5–12)
NEUTROPHILS # BLD AUTO: 4 10*3/MM3 (ref 1.9–8.1)
NEUTROPHILS NFR BLD AUTO: 58.8 % (ref 42.7–76)
PLATELET # BLD AUTO: 192 10*3/MM3 (ref 140–500)
PMV BLD AUTO: 9.3 FL (ref 6–12)
RBC # BLD AUTO: 4.6 10*6/MM3 (ref 4.6–6)
WBC NRBC COR # BLD: 6.8 10*3/MM3 (ref 4.5–10.7)

## 2017-07-26 PROCEDURE — 92928 PRQ TCAT PLMT NTRAC ST 1 LES: CPT | Performed by: INTERNAL MEDICINE

## 2017-07-26 PROCEDURE — C9600 PERC DRUG-EL COR STENT SING: HCPCS | Performed by: INTERNAL MEDICINE

## 2017-07-26 PROCEDURE — 85347 COAGULATION TIME ACTIVATED: CPT

## 2017-07-26 PROCEDURE — C1887 CATHETER, GUIDING: HCPCS | Performed by: INTERNAL MEDICINE

## 2017-07-26 PROCEDURE — C1874 STENT, COATED/COV W/DEL SYS: HCPCS | Performed by: INTERNAL MEDICINE

## 2017-07-26 PROCEDURE — C1894 INTRO/SHEATH, NON-LASER: HCPCS | Performed by: INTERNAL MEDICINE

## 2017-07-26 PROCEDURE — 0 IOPAMIDOL PER 1 ML: Performed by: INTERNAL MEDICINE

## 2017-07-26 PROCEDURE — 93005 ELECTROCARDIOGRAM TRACING: CPT | Performed by: INTERNAL MEDICINE

## 2017-07-26 PROCEDURE — 25010000002 FENTANYL CITRATE (PF) 100 MCG/2ML SOLUTION: Performed by: INTERNAL MEDICINE

## 2017-07-26 PROCEDURE — 25010000002 ONDANSETRON PER 1 MG: Performed by: INTERNAL MEDICINE

## 2017-07-26 PROCEDURE — C1769 GUIDE WIRE: HCPCS | Performed by: INTERNAL MEDICINE

## 2017-07-26 PROCEDURE — 25010000002 HEPARIN (PORCINE) PER 1000 UNITS: Performed by: INTERNAL MEDICINE

## 2017-07-26 PROCEDURE — 25010000002 MIDAZOLAM PER 1 MG: Performed by: INTERNAL MEDICINE

## 2017-07-26 PROCEDURE — G0378 HOSPITAL OBSERVATION PER HR: HCPCS

## 2017-07-26 PROCEDURE — C1725 CATH, TRANSLUMIN NON-LASER: HCPCS | Performed by: INTERNAL MEDICINE

## 2017-07-26 PROCEDURE — 93454 CORONARY ARTERY ANGIO S&I: CPT | Performed by: INTERNAL MEDICINE

## 2017-07-26 PROCEDURE — 93010 ELECTROCARDIOGRAM REPORT: CPT | Performed by: INTERNAL MEDICINE

## 2017-07-26 PROCEDURE — 99152 MOD SED SAME PHYS/QHP 5/>YRS: CPT | Performed by: INTERNAL MEDICINE

## 2017-07-26 PROCEDURE — 94799 UNLISTED PULMONARY SVC/PX: CPT

## 2017-07-26 PROCEDURE — 99213 OFFICE O/P EST LOW 20 MIN: CPT | Performed by: NURSE PRACTITIONER

## 2017-07-26 PROCEDURE — 85025 COMPLETE CBC W/AUTO DIFF WBC: CPT | Performed by: INTERNAL MEDICINE

## 2017-07-26 DEVICE — XIENCE ALPINE EVEROLIMUS ELUTING CORONARY STENT SYSTEM 3.00 MM X 18 MM / RAPID-EXCHANGE
Type: IMPLANTABLE DEVICE | Status: FUNCTIONAL
Brand: XIENCE ALPINE

## 2017-07-26 DEVICE — XIENCE ALPINE EVEROLIMUS ELUTING CORONARY STENT SYSTEM 3.50 MM X 33 MM / RAPID-EXCHANGE
Type: IMPLANTABLE DEVICE | Status: FUNCTIONAL
Brand: XIENCE ALPINE

## 2017-07-26 RX ORDER — NALOXONE HCL 0.4 MG/ML
0.4 VIAL (ML) INJECTION
Status: DISCONTINUED | OUTPATIENT
Start: 2017-07-26 | End: 2017-07-27 | Stop reason: HOSPADM

## 2017-07-26 RX ORDER — FOLIC ACID 1 MG/1
2 TABLET ORAL DAILY
Status: DISCONTINUED | OUTPATIENT
Start: 2017-07-26 | End: 2017-07-27 | Stop reason: HOSPADM

## 2017-07-26 RX ORDER — VERAPAMIL HYDROCHLORIDE 2.5 MG/ML
INJECTION, SOLUTION INTRAVENOUS AS NEEDED
Status: DISCONTINUED | OUTPATIENT
Start: 2017-07-26 | End: 2017-07-26 | Stop reason: HOSPADM

## 2017-07-26 RX ORDER — ACETAMINOPHEN 325 MG/1
650 TABLET ORAL EVERY 4 HOURS PRN
Status: DISCONTINUED | OUTPATIENT
Start: 2017-07-26 | End: 2017-07-27 | Stop reason: HOSPADM

## 2017-07-26 RX ORDER — MELATONIN
2000 DAILY
Status: DISCONTINUED | OUTPATIENT
Start: 2017-07-26 | End: 2017-07-27 | Stop reason: HOSPADM

## 2017-07-26 RX ORDER — ONDANSETRON 2 MG/ML
INJECTION INTRAMUSCULAR; INTRAVENOUS AS NEEDED
Status: DISCONTINUED | OUTPATIENT
Start: 2017-07-26 | End: 2017-07-26 | Stop reason: HOSPADM

## 2017-07-26 RX ORDER — ASPIRIN 325 MG
TABLET ORAL AS NEEDED
Status: DISCONTINUED | OUTPATIENT
Start: 2017-07-26 | End: 2017-07-26 | Stop reason: HOSPADM

## 2017-07-26 RX ORDER — CLOPIDOGREL BISULFATE 75 MG/1
75 TABLET ORAL DAILY
Status: DISCONTINUED | OUTPATIENT
Start: 2017-07-27 | End: 2017-07-27 | Stop reason: HOSPADM

## 2017-07-26 RX ORDER — LIDOCAINE HYDROCHLORIDE 20 MG/ML
INJECTION, SOLUTION INFILTRATION; PERINEURAL AS NEEDED
Status: DISCONTINUED | OUTPATIENT
Start: 2017-07-26 | End: 2017-07-26 | Stop reason: HOSPADM

## 2017-07-26 RX ORDER — SODIUM CHLORIDE 9 MG/ML
INJECTION, SOLUTION INTRAVENOUS CONTINUOUS PRN
Status: DISCONTINUED | OUTPATIENT
Start: 2017-07-26 | End: 2017-07-26 | Stop reason: HOSPADM

## 2017-07-26 RX ORDER — ONDANSETRON 4 MG/1
4 TABLET, ORALLY DISINTEGRATING ORAL EVERY 6 HOURS PRN
Status: DISCONTINUED | OUTPATIENT
Start: 2017-07-26 | End: 2017-07-27 | Stop reason: HOSPADM

## 2017-07-26 RX ORDER — ATORVASTATIN CALCIUM 20 MG/1
40 TABLET, FILM COATED ORAL DAILY
Status: DISCONTINUED | OUTPATIENT
Start: 2017-07-26 | End: 2017-07-27 | Stop reason: HOSPADM

## 2017-07-26 RX ORDER — TEMAZEPAM 15 MG/1
15 CAPSULE ORAL NIGHTLY PRN
Status: DISCONTINUED | OUTPATIENT
Start: 2017-07-26 | End: 2017-07-27 | Stop reason: HOSPADM

## 2017-07-26 RX ORDER — HYDROCODONE BITARTRATE AND ACETAMINOPHEN 5; 325 MG/1; MG/1
1 TABLET ORAL EVERY 4 HOURS PRN
Status: DISCONTINUED | OUTPATIENT
Start: 2017-07-26 | End: 2017-07-27 | Stop reason: HOSPADM

## 2017-07-26 RX ORDER — ONDANSETRON 2 MG/ML
4 INJECTION INTRAMUSCULAR; INTRAVENOUS EVERY 6 HOURS PRN
Status: DISCONTINUED | OUTPATIENT
Start: 2017-07-26 | End: 2017-07-27 | Stop reason: HOSPADM

## 2017-07-26 RX ORDER — SODIUM CHLORIDE 9 MG/ML
50 INJECTION, SOLUTION INTRAVENOUS CONTINUOUS
Status: ACTIVE | OUTPATIENT
Start: 2017-07-26 | End: 2017-07-26

## 2017-07-26 RX ORDER — ASPIRIN 81 MG/1
81 TABLET ORAL DAILY
Status: DISCONTINUED | OUTPATIENT
Start: 2017-07-26 | End: 2017-07-27 | Stop reason: HOSPADM

## 2017-07-26 RX ORDER — MIDAZOLAM HYDROCHLORIDE 1 MG/ML
INJECTION INTRAMUSCULAR; INTRAVENOUS AS NEEDED
Status: DISCONTINUED | OUTPATIENT
Start: 2017-07-26 | End: 2017-07-26 | Stop reason: HOSPADM

## 2017-07-26 RX ORDER — LIDOCAINE HYDROCHLORIDE 10 MG/ML
0.1 INJECTION, SOLUTION EPIDURAL; INFILTRATION; INTRACAUDAL; PERINEURAL ONCE AS NEEDED
Status: DISCONTINUED | OUTPATIENT
Start: 2017-07-26 | End: 2017-07-26

## 2017-07-26 RX ORDER — CLOPIDOGREL BISULFATE 75 MG/1
TABLET ORAL AS NEEDED
Status: DISCONTINUED | OUTPATIENT
Start: 2017-07-26 | End: 2017-07-26 | Stop reason: HOSPADM

## 2017-07-26 RX ORDER — LATANOPROST 50 UG/ML
1 SOLUTION/ DROPS OPHTHALMIC NIGHTLY
Status: DISCONTINUED | OUTPATIENT
Start: 2017-07-26 | End: 2017-07-27 | Stop reason: HOSPADM

## 2017-07-26 RX ORDER — CARVEDILOL 12.5 MG/1
12.5 TABLET ORAL 2 TIMES DAILY WITH MEALS
Status: DISCONTINUED | OUTPATIENT
Start: 2017-07-26 | End: 2017-07-27 | Stop reason: HOSPADM

## 2017-07-26 RX ORDER — FENTANYL CITRATE 50 UG/ML
INJECTION, SOLUTION INTRAMUSCULAR; INTRAVENOUS AS NEEDED
Status: DISCONTINUED | OUTPATIENT
Start: 2017-07-26 | End: 2017-07-26 | Stop reason: HOSPADM

## 2017-07-26 RX ORDER — SODIUM CHLORIDE 0.9 % (FLUSH) 0.9 %
1-10 SYRINGE (ML) INJECTION AS NEEDED
Status: DISCONTINUED | OUTPATIENT
Start: 2017-07-26 | End: 2017-07-26

## 2017-07-26 RX ORDER — HEPARIN SODIUM 1000 [USP'U]/ML
INJECTION, SOLUTION INTRAVENOUS; SUBCUTANEOUS AS NEEDED
Status: DISCONTINUED | OUTPATIENT
Start: 2017-07-26 | End: 2017-07-26 | Stop reason: HOSPADM

## 2017-07-26 RX ORDER — ONDANSETRON 4 MG/1
4 TABLET, FILM COATED ORAL EVERY 6 HOURS PRN
Status: DISCONTINUED | OUTPATIENT
Start: 2017-07-26 | End: 2017-07-27 | Stop reason: HOSPADM

## 2017-07-26 RX ORDER — SODIUM CHLORIDE 9 MG/ML
75 INJECTION, SOLUTION INTRAVENOUS CONTINUOUS
Status: DISCONTINUED | OUTPATIENT
Start: 2017-07-26 | End: 2017-07-26

## 2017-07-26 RX ORDER — AMLODIPINE BESYLATE 5 MG/1
5 TABLET ORAL 2 TIMES DAILY
Status: DISCONTINUED | OUTPATIENT
Start: 2017-07-26 | End: 2017-07-27 | Stop reason: HOSPADM

## 2017-07-26 RX ORDER — NITROGLYCERIN 5 MG/ML
INJECTION, SOLUTION INTRAVENOUS AS NEEDED
Status: DISCONTINUED | OUTPATIENT
Start: 2017-07-26 | End: 2017-07-26 | Stop reason: HOSPADM

## 2017-07-26 RX ORDER — TERAZOSIN 10 MG/1
10 CAPSULE ORAL NIGHTLY
Status: DISCONTINUED | OUTPATIENT
Start: 2017-07-26 | End: 2017-07-27 | Stop reason: HOSPADM

## 2017-07-26 RX ADMIN — SODIUM CHLORIDE 75 ML/HR: 9 INJECTION, SOLUTION INTRAVENOUS at 08:10

## 2017-07-26 RX ADMIN — LATANOPROST 1 DROP: 50 SOLUTION/ DROPS OPHTHALMIC at 21:47

## 2017-07-26 RX ADMIN — ATORVASTATIN CALCIUM 40 MG: 20 TABLET, FILM COATED ORAL at 18:28

## 2017-07-26 RX ADMIN — TERAZOSIN HYDROCHLORIDE ANHYDROUS 10 MG: 10 CAPSULE ORAL at 21:47

## 2017-07-26 RX ADMIN — SODIUM CHLORIDE 50 ML/HR: 9 INJECTION, SOLUTION INTRAVENOUS at 11:13

## 2017-07-26 RX ADMIN — CARVEDILOL 12.5 MG: 12.5 TABLET, FILM COATED ORAL at 18:32

## 2017-07-26 RX ADMIN — AMLODIPINE BESYLATE 5 MG: 5 TABLET ORAL at 18:28

## 2017-07-26 NOTE — CONSULTS
Electrophysiology Hospital Consult            Patient Name: Brayan Cintron Jr  Age/Sex: 71 y.o. male  : 1945  MRN: [id number]    Date of Admission: 2017  Date of Encounter Visit: 17  Encounter Provider: YANICK Srivastava  Referring Provider: Juan Ramon Davis MD  Place of Service: [facility] CARDIOLOG[facility]  Patient Care Team:  Citlaly Santoyo MD as PCP - General (Internal Medicine)  Citlaly Santoyo MD as PCP - Claims Attributed      Subjective:   EP Consultation for: Polymorphic VT on Linq monitor on 17    Chief Complaint: None    History of Present Illness:  Brayan Cintron Jr is a 71 y.o. male patient of Dr. Maxwell. He has a past medical history of HTN, SVT (s/p ablation 2015 AVNRT), NSTEMI ?precipitated by the SVT, left PCA distribution CVA (vision changes 2016), HTN and HLD. He apparently had a history of WCT prior to 2015 ablation but Dr. Ramirez was unable to induce any VT in the baseline state or with isoproterenol in 2015. Due to his stoke he had an extensive cardiac workup which included a SAMY but no cardioembolic source was identified so a loop recorder was placed and he was dismissed home on aspirin. He had further neuro symptoms in 2016, had an MRI which showed stenosis of both posterior cerebral arteries with progressive disease and it sounds like this is when clopidogrel was added. Until 2017 his Linq only showed PAC's but in 2017 he was noted to have atrial fib and his carvedilol was increased and he was switched from ASA and clopidogrel to apixiban. A Linq transmission was received which showed a WCT on 17 so he was scheduled an appointment to see YANICK Horowitz in our office on 17. The date and time of the WCT the patient thinks he was swimming. He does not remember having any symptoms. When he saw Miriam he had no complaints. Given his arrhythmia he had an echo and a stress test on 17. Stress test was  normal with EF 53% and no evidence of ischemia and echo showed NL LV function, mild to moderate LVH with mild MR & TR. Dr. MCCARTHY did have Dr. Ramirez review the strips and he recommended cath regardless of the nuclear and echo findings. He underwent cath today which revealed significant CAD and he had PCI/XOCHITL to mid RCA. We have been ask to see him for his polymorphic VT. He has not had any chest pain, shortness of breath, PND, orthopnea, dizziness of syncope.             Past Medical History:  Past Medical History:   Diagnosis Date   • Acute renal failure    • Arrhythmia     SVT, A-FIB, V-TACH   • Chronic renal insufficiency    • Colon polyp    • Edema    • Hyperlipidemia    • Hypertension    • Ischemic stroke 09/2016   • Ischemic stroke 11/2016   • Melanoma    • MI (myocardial infarction)     NSTEMI DUE TO RE ENTRY TACHYCARDIA   • XENA (obstructive sleep apnea)    • Paroxysmal supraventricular tachycardia    • Right arm numbness    • Right facial numbness    • Right leg numbness    • Right sided weakness    • Shingles    • Squamous cell carcinoma    • Squamous cell carcinoma of skin    • SVT (supraventricular tachycardia) 2015   • Syncope    • Tachycardia        Past Surgical History:   Procedure Laterality Date   • CARDIAC ABLATION  2015    SVT   • CARDIAC ELECTROPHYSIOLOGY PROCEDURE N/A 10/3/2016    Procedure: Reveal implant  LINQ;  Surgeon: Wong Zavala MD;  Location: [facility] CATH INVASIVE LOCATION;  Service:    • CARDIAC ELECTROPHYSIOLOGY PROCEDURE Left 12/19/2016    Procedure: Loop recorder removal and reimplant  linq;  Surgeon: Wong Zavala MD;  Location: [facility] CATH INVASIVE LOCATION;  Service:    • CYST REMOVAL      off tailbone       Home Medications:   Prescriptions Prior to Admission   Medication Sig Dispense Refill Last Dose   • amLODIPine (NORVASC) 5 MG tablet Take 5 mg by mouth 2 (Two) Times a Day.   7/26/2017 at 0700   • atorvastatin (LIPITOR) 40 MG tablet Take 40 mg by mouth Daily.    7/25/2017 at 1900   • carvedilol (COREG) 12.5 MG tablet Take 12.5 mg by mouth 2 (Two) Times a Day With Meals.   7/26/2017 at 0700   • cholecalciferol (VITAMIN D3) 1000 UNITS tablet Take 2,000 Units by mouth Daily.   7/26/2017 at 0700   • folic acid (FOLVITE) 1 MG tablet Take 2 mg by mouth Daily.   7/26/2017 at 0700   • latanoprost (XALATAN) 0.005 % ophthalmic solution Administer 1 drop to both eyes Every Night.   7/25/2017 at 2000   • terazosin (HYTRIN) 10 MG capsule Take 10 mg by mouth Every Night.   7/25/2017 at 1900   • apixaban (ELIQUIS) 5 MG tablet tablet Take 5 mg by mouth 2 (Two) Times a Day.   07/24/2017 at 0700       Allergies:  No Known Allergies    Past Social History:  Social History     Social History   • Marital status:      Spouse name: N/A   • Number of children: N/A   • Years of education: N/A     Occupational History   • Not on file.     Social History Main Topics   • Smoking status: Never Smoker   • Smokeless tobacco: Not on file      Comment: caffeine use   • Alcohol use Yes      Comment: very rare alcohol use   • Drug use: Yes     Special: Marijuana      Comment: on occ   • Sexual activity: Defer     Other Topics Concern   • Not on file     Social History Narrative       Past Family History:  Family History   Problem Relation Age of Onset   • Hypertension Mother    • Heart disease Mother    • Transient ischemic attack Mother    • Diabetes Mother    • Heart attack Brother    • Heart disease Brother    • Hypertension Brother    • Cancer Father    • Hypertension Father        Review of Systems: All systems reviewed. Pertinent positives identified in HPI. All other systems are negative.     14 point ROS was performed and is negative except as outlined in HPI.     Objective:     Objective:  Vital Signs (last 24 hours)       07/25 0700  -  07/26 0659 07/26 0700  -  07/26 1516   Most Recent    Temp (°F)   97.8 -  97.9     97.8 (36.6)    Heart Rate   (!)44 -  59     50    Resp   16 -  17     17     BP   144/79 -  176/90     162/83    SpO2 (%)   (!)89 -  98     96        Temp:  [97.8 °F (36.6 °C)-97.9 °F (36.6 °C)] 97.8 °F (36.6 °C)  Heart Rate:  [44-59] 50  Resp:  [16-17] 17  BP: (144-176)/(76-90) 162/83  Body mass index is 29.5 kg/(m^2).        Physical Exam:   Physical Exam   Constitutional: He is oriented to person, place, and time. He appears well-developed and well-nourished. No distress.   HENT:   Head: Normocephalic and atraumatic.   Eyes: Conjunctivae are normal. No scleral icterus.   Neck: Neck supple. No JVD present.   Cardiovascular: Normal rate, regular rhythm and normal heart sounds.    Pulmonary/Chest: Effort normal and breath sounds normal. No respiratory distress.   Abdominal: Soft.   Musculoskeletal: Normal range of motion. He exhibits no edema.   Neurological: He is alert and oriented to person, place, and time.   Skin: Skin is warm and dry.   Psychiatric: He has a normal mood and affect. His behavior is normal.   Vitals reviewed.       Labs:   Lab Review:       Results from last 7 days  Lab Units 17  1749 17  1651   SODIUM mmol/L 142 139   POTASSIUM mmol/L 4.4 4.1   CHLORIDE mmol/L 105 102   CO2 mmol/L 25.9 24.9   BUN mg/dL 27* 24*   CREATININE mg/dL 1.62* 1.31*   GLUCOSE mg/dL 106* 97   CALCIUM mg/dL 9.1 9.7           Results from last 7 days  Lab Units 17  0811   WBC 10*3/mm3 6.80   HEMOGLOBIN g/dL 13.8   HEMATOCRIT % 41.3   PLATELETS 10*3/mm3 192               Results from last 7 days  Lab Units 17  1749   MAGNESIUM mg/dL 2.2                     Imagin/26/17 Cath/PCI:    Conclusions:   1. Left main: Normal  2. LAD: 20% proximal stenosis.  Diffuse, calcified 30-50% mid vessel stenosis.  3. LCX: Small caliber distal circumflex with sequential discrete 80% stenoses  4. RCA: Severe calcification.  Diffuse 60-70% mid vessel stenosis.  Discrete, severely calcified, ulcerated 90% stenosis distally.  Small caliber RPL with discrete 60-70% ostial stenosis   5.   PCI of the distal RCA with a 3.0 x 18 mm Xience Alpine drug-eluting stent.  PCI to the mid RCA with a 3.5 x 33 mm Xience Alpine drug-eluting stent postdilated to high pressure.     Recommendations: Consider triple therapy for one month with movement over to Plavix and Eliquis therapy after for 1 year.  Could move back to aspirin along with Eliquis after 1 year of Plavix.     7/20/17 Echo:    Interpretation Summary      · Left ventricular systolic function is normal. Calculated EF = 57.8%. Estimated EF was in agreement with the calculated EF. Normal left ventricular cavity size noted. All left ventricular wall segments contract normally.  · Left ventricular wall thickness is consistent with moderate concentric hypertrophy.  · There is mild calcification of the aortic valve  · Mild mitral valve regurgitation is present.  · Mild tricuspid valve regurgitation is present. Estimated right ventricular systolic pressure from tricuspid regurgitation is normal (<35 mmHg).     7/20/17 Nuclear stress test:    Interpretation Summary   · Left ventricular ejection fraction is normal (Calculated EF = 53%).  · Myocardial perfusion imaging indicates a normal myocardial perfusion study with no evidence of ischemia.  · Impressions are consistent with a low risk study.       EKG:           I personally viewed and interpreted the patient's EKG/Telemetry data.    Assessment:     Principal Problem:    Polymorphic ventricular tachycardia        Plan:     Dr. Ramirez and I saw this patient after his PCI. Strips reviewed and had long discussion with him and his wife regarding healthy lifestyle and the importance of follow up.     -Polymorphic VT in the setting of ischemic heart disease. S/p cath/PCI/XOCHITL today. Recommend continue beta blocker and continued close follow up.     Thank you for allowing me to participate in the care of Brayan Cintron Jr. Feel free to contact me directly with any further questions or concerns.    Maki Sotelo,  YANICK  [facility]  07/26/17  3:16 PM

## 2017-07-26 NOTE — NURSING NOTE
"Returned to room.  Pt awake and reading his stent education booklet.  Wife at bedside.  She confirms they are moving in 1 1/2 weeks to [place].  I encouraged them to pursue outpatient cardiac rehab there.  Advised to take pt's AVS to first appointment should he decide to attend.  Not sure he will pursue since he swims everyday and lifts weights.  Denies most risk factors as well.  Provided them with education booklet, \"Understanding Cardiac Rehabilitation\".  "

## 2017-07-26 NOTE — H&P (VIEW-ONLY)
Date of Office Visit: 2017  Encounter Provider: YANICK Giles  Place of Service: [facility] CARDIOLOGY  Patient Name: Brayan Cintron Jr  :1945    Chief Complaint   Patient presents with   • Rapid Heart Rate   :     HPI: Brayan Cintron Jr is a 71 y.o. male comes in today for abnormal LINQ report. He is a patient of Dr. Maxwell and I am seeing him for the first time today.     He has a history of hypertension, paroxysmal SVT and a possible embolic stroke.  , he had a wide complex tachycardia and non-ST elevation myocardial infarction.  He had a stress test that did not reveal ischemia and underwent ablation of the AV node was reentry tachycardia which is felt to have mediated the myocardial infarction.  More recently in 2016 he complained of vision changes and was found to have 2 infarction the left PCA distribution.  He underwent transesophageal imaging that revealed no cord embolic source.  The loop recorder was placed and he was dismissed home on aspirin.  His Linq device checked in October had revealed no interim atrial arrhythmias with PACs have been present.  In 2016 with ongoing symptoms of right upper and lower extremity paresthesias and MRI of his head was obtained that revealed stenosis of both posterior cerebral arteries with progressive disease.  He underwent further neurologic testing and no clear etiology was discerned.  He was treated medically.  Blood pressure was elevated at the time and Coreg was increased Norvasc was decreased since he had PACs or frequent.  In 2017 he was noted to have atrial fibrillation on his Linq device.  Coreg was increased and he was switched Eliquis from aspirin and Plavix therapy after much discussion.    Follow-up with Dr. Maxwell on 2017 and was doing well at that time.    We received a Linq interrogation showing a wide complex tachycardia.  The patient was asked to come in for  assessment.    Today, he comes in and reports that on the date of the report from July 6, 2017, he had no symptoms that he recalls.  The event happened at 245 and he was likely swimming.  Denies any palpitations or tachycardia, shortness breath, edema, lightheadedness, chest pain, fatigue, orthopnea or PND.    Past Medical History:   Diagnosis Date   • Acute renal failure    • Arrhythmia    • Chronic renal insufficiency    • Colon polyp    • Edema    • Hyperlipidemia    • Hypertension    • Melanoma    • XENA (obstructive sleep apnea)    • Paroxysmal supraventricular tachycardia    • Right arm numbness    • Right facial numbness    • Right leg numbness    • Right sided weakness    • Shingles    • Squamous cell carcinoma    • Squamous cell carcinoma of skin    • Stroke    • Syncope    • Tachycardia        Past Surgical History:   Procedure Laterality Date   • CARDIAC ABLATION     • CARDIAC ELECTROPHYSIOLOGY PROCEDURE N/A 10/3/2016    Procedure: Reveal implant  LINQ;  Surgeon: Wong Zavala MD;  Location: [facility] CATH INVASIVE LOCATION;  Service:    • CARDIAC ELECTROPHYSIOLOGY PROCEDURE Left 12/19/2016    Procedure: Loop recorder removal and reimplant  linq;  Surgeon: Wong Zavala MD;  Location: [facility] CATH INVASIVE LOCATION;  Service:    • CYST REMOVAL      off tailbone           Review of Systems   Constitution: Negative for fever and malaise/fatigue.   HENT: Negative for ear pain, hearing loss, nosebleeds and sore throat.    Eyes: Negative for double vision, pain, vision loss in left eye, vision loss in right eye and visual disturbance.   Cardiovascular: Negative for claudication and leg swelling.   Respiratory: Negative for cough, snoring and wheezing.    Endocrine: Negative for cold intolerance, heat intolerance and polyuria.   Skin: Negative for color change, itching and rash.   Musculoskeletal: Negative for joint pain, joint swelling and muscle cramps.   Gastrointestinal: Negative for abdominal pain,  "diarrhea, melena, nausea and vomiting.   Genitourinary: Negative for bladder incontinence and hematuria.   Neurological: Negative for excessive daytime sleepiness, dizziness, light-headedness, paresthesias and seizures.   Psychiatric/Behavioral: Negative for depression. The patient is not nervous/anxious.    All other systems reviewed and are negative.    All other systems reviewed and are negative    No Known Allergies    All aspects of family and social history reviewed.          Objective:     Vitals:    07/19/17 1527   BP: 120/70   BP Location: Left arm   Pulse: 51   Weight: 209 lb (94.8 kg)   Height: 69\" (175.3 cm)     Body mass index is 30.86 kg/(m^2).    PHYSICAL EXAM:  Physical Exam   Constitutional: He is oriented to person, place, and time. He appears well-developed and well-nourished.   Obese   HENT:   Head: Normocephalic.   Nose: Nose normal.   Mouth/Throat: Oropharynx is clear and moist.   Eyes: Conjunctivae and EOM are normal. Pupils are equal, round, and reactive to light. Right eye exhibits no discharge. No scleral icterus.   Neck: Normal range of motion. Neck supple. No JVD present. No thyromegaly present.   Cardiovascular: Regular rhythm, normal heart sounds and intact distal pulses.  Bradycardia present.  Exam reveals no gallop and no friction rub.    No murmur heard.  Pulses:       Carotid pulses are 2+ on the right side, and 2+ on the left side.       Radial pulses are 2+ on the right side, and 2+ on the left side.        Femoral pulses are 2+ on the right side, and 2+ on the left side.       Popliteal pulses are 2+ on the right side, and 2+ on the left side.        Dorsalis pedis pulses are 2+ on the right side, and 2+ on the left side.        Posterior tibial pulses are 2+ on the right side, and 2+ on the left side.   Pulmonary/Chest: Effort normal and breath sounds normal. No respiratory distress. He has no wheezes. He has no rales.   Abdominal: Soft. Bowel sounds are normal. He exhibits no " distension. There is no hepatosplenomegaly. There is no tenderness. There is no rebound.   Musculoskeletal: Normal range of motion. He exhibits no edema or tenderness.   Neurological: He is alert and oriented to person, place, and time.   Skin: Skin is warm and dry. No rash noted. No erythema.   Psychiatric: He has a normal mood and affect. His behavior is normal. Judgment and thought content normal.   Vitals reviewed.        ECG 12 Lead  Date/Time: 7/21/2017 2:57 PM  Performed by: JEAN-PIERRE GARCIA  Authorized by: JEAN-PIERRE GARCIA   Rhythm: sinus rhythm  Rate: bradycardic  BPM: 51  Conduction: conduction normal  ST Segments: ST segments normal  T Waves: T waves normal  QRS axis: normal  Other: no other findings  Clinical impression: normal ECG  Comments: Indication: bradycardia, VT                  Assessment:       Diagnosis Plan   1. Ventricular tachycardia  Adult Transthoracic Echo Complete    Stress Test With Myocardial Perfusion    Basic Metabolic Panel    Magnesium   2. Abnormal electrocardiogram   Stress Test With Myocardial Perfusion   3. Abnormal ECG     4. PAF (paroxysmal atrial fibrillation)     5. SVT (supraventricular tachycardia)          Orders Placed This Encounter   Procedures   • Basic Metabolic Panel     Standing Status:   Future     Number of Occurrences:   1     Standing Expiration Date:   7/19/2018   • Magnesium     Standing Status:   Future     Number of Occurrences:   1     Standing Expiration Date:   7/19/2018   • Stress Test With Myocardial Perfusion     Standing Status:   Future     Number of Occurrences:   1     Standing Expiration Date:   7/19/2018     Order Specific Question:   What stress agent will be used?     Answer:   Regadenoson (Lexiscan)     Order Specific Question:   Difficulty walking criteria?     Answer:   AFib/VTach     Order Specific Question:   Reason for exam?     Answer:   Arrhythmia     Order Specific Question:   Arrhythmia(s)?     Answer:   VTach/VFib   • ECG 12  Lead     This order was created via procedure documentation   • Adult Transthoracic Echo Complete     Standing Status:   Future     Number of Occurrences:   1     Order Specific Question:   Reason for exam?     Answer:   Arrhythmia     Order Specific Question:   Arrhythmias specification?     Answer:   VTach/VFib       Current Outpatient Prescriptions   Medication Sig Dispense Refill   • amLODIPine (NORVASC) 5 MG tablet Take 1 tablet by mouth Daily. (Patient taking differently: Take 5 mg by mouth Every Morning. 2.5mg Q PM) 90 tablet 3   • apixaban (ELIQUIS) 5 MG tablet tablet Take 5 mg by mouth 2 (Two) Times a Day.     • atorvastatin (LIPITOR) 40 MG tablet Take 1 tablet by mouth Daily. 90 tablet 3   • benazepril (LOTENSIN) 40 MG tablet Take 1 tablet by mouth 2 (Two) Times a Day. 180 tablet 1   • carvedilol (COREG) 12.5 MG tablet Take 1 tablet by mouth 2 (Two) Times a Day. 180 tablet 3   • cholecalciferol (VITAMIN D3) 1000 UNITS tablet Take 2,000 Units by mouth Daily.     • folic acid (FOLVITE) 1 MG tablet Take 2 mg by mouth Daily.     • latanoprost (XALATAN) 0.005 % ophthalmic solution Administer 1 drop to both eyes Every Night.     • terazosin (HYTRIN) 10 MG capsule Take 10 mg by mouth Every Night.       No current facility-administered medications for this visit.             Plan:       1.Ventricular tachycardia-patient had what appears to be ventricular tachycardia on a sling device.I am going to set him up for echocardiogram and nuclear stress test.  Unable to increase carvedilol due to bradycardia.  His SVT in the past was wide complex of this may be SVT.  Depending on his test results and may need him to see Dr. Ramirez again although he was asymptomatic.  Dr. Zavala did review the strip and thinks it could be torsades.  Will check electrolytes.  2. Atrial Fibrillation and Atrial Flutter  Assessment  • The patient has paroxysmal atrial fibrillation  • This is non-valvular in etiology  • The patient's  CHADS2-VASc score is 4  • A [id number] score of 2 or more is considered a high risk for a thromboembolic event  • Warfarin not prescribed  • Dabigatran not prescribed  • Rivaroxaban not prescribed  • Apixaban not prescribed  • Aspirin prescribed    Plan  • Attempt to maintain sinus rhythm  • Continue aspirin for antithrombotic therapy, bleeding issues discussed  • Add apixaban for antithrombotic therapy  • Continue beta blocker for rhythm control    Follow up in office to be determined after testing.    As always, it has been a pleasure to participate in this patient's care.      Sincerely,      YANICK Giles

## 2017-07-26 NOTE — NURSING NOTE
"Stopped by room to talk to pt about cardiac rehab.  Pt drowsy now and says he feels \"odd\".  No family present.  Pt says his wife went to let the dog out.  I explained purpose of my visit and told him I would come back later.  Pt says he won't be around to attend cardiac rehab because he is in process of moving to [place] where his son lives.  "

## 2017-07-26 NOTE — PLAN OF CARE
Problem: Patient Care Overview (Adult)  Goal: Plan of Care Review  Outcome: Ongoing (interventions implemented as appropriate)    07/26/17 0849   Coping/Psychosocial Response Interventions   Plan Of Care Reviewed With patient   Patient Care Overview   Progress no change   Outcome Evaluation   Outcome Summary/Follow up Plan Care plan initiated. Status post cardiac catherization with XOCHITL x2 to right coronary artery. Pt in sinus bradycardia. Right radial site soft with dressing dry and intact. Hourly checks continue. Pt no longer c/o nausea. NS at 50cc continues for 10 hours. Pt wife voices concern regarding HTN-BP medications administered. Continue to closely monitor for effect. Plan for discharge tomorrow.        Goal: Adult Individualization and Mutuality  Outcome: Ongoing (interventions implemented as appropriate)  Goal: Discharge Needs Assessment  Outcome: Ongoing (interventions implemented as appropriate)    Problem: Cardiac Catheterization with/without PCI (Adult)  Goal: Signs and Symptoms of Listed Potential Problems Will be Absent or Manageable (Cardiac Catheterization with/without PCI)  Outcome: Ongoing (interventions implemented as appropriate)

## 2017-07-27 ENCOUNTER — TELEPHONE (OUTPATIENT)
Dept: CARDIOLOGY | Facility: CLINIC | Age: 72
End: 2017-07-27

## 2017-07-27 VITALS
OXYGEN SATURATION: 92 % | SYSTOLIC BLOOD PRESSURE: 156 MMHG | BODY MASS INDEX: 29.43 KG/M2 | DIASTOLIC BLOOD PRESSURE: 85 MMHG | TEMPERATURE: 97.7 F | HEIGHT: 70 IN | HEART RATE: 66 BPM | RESPIRATION RATE: 18 BRPM | WEIGHT: 205.56 LBS

## 2017-07-27 LAB
ACT BLD: 219 SECONDS (ref 82–152)
ANION GAP SERPL CALCULATED.3IONS-SCNC: 12.4 MMOL/L
BUN BLD-MCNC: 16 MG/DL (ref 8–23)
BUN/CREAT SERPL: 14.3 (ref 7–25)
CALCIUM SPEC-SCNC: 8.7 MG/DL (ref 8.6–10.5)
CHLORIDE SERPL-SCNC: 104 MMOL/L (ref 98–107)
CO2 SERPL-SCNC: 26.6 MMOL/L (ref 22–29)
CREAT BLD-MCNC: 1.12 MG/DL (ref 0.76–1.27)
DEPRECATED RDW RBC AUTO: 41.7 FL (ref 37–54)
ERYTHROCYTE [DISTWIDTH] IN BLOOD BY AUTOMATED COUNT: 12.6 % (ref 11.5–14.5)
GFR SERPL CREATININE-BSD FRML MDRD: 65 ML/MIN/1.73
GLUCOSE BLD-MCNC: 100 MG/DL (ref 65–99)
HCT VFR BLD AUTO: 40.2 % (ref 40.4–52.2)
HGB BLD-MCNC: 13.4 G/DL (ref 13.7–17.6)
MCH RBC QN AUTO: 30.4 PG (ref 27–32.7)
MCHC RBC AUTO-ENTMCNC: 33.3 G/DL (ref 32.6–36.4)
MCV RBC AUTO: 91.2 FL (ref 79.8–96.2)
PLATELET # BLD AUTO: 195 10*3/MM3 (ref 140–500)
PMV BLD AUTO: 9.6 FL (ref 6–12)
POTASSIUM BLD-SCNC: 4.2 MMOL/L (ref 3.5–5.2)
RBC # BLD AUTO: 4.41 10*6/MM3 (ref 4.6–6)
SODIUM BLD-SCNC: 143 MMOL/L (ref 136–145)
WBC NRBC COR # BLD: 10.15 10*3/MM3 (ref 4.5–10.7)

## 2017-07-27 PROCEDURE — 93005 ELECTROCARDIOGRAM TRACING: CPT | Performed by: INTERNAL MEDICINE

## 2017-07-27 PROCEDURE — 99217 PR OBSERVATION CARE DISCHARGE MANAGEMENT: CPT | Performed by: INTERNAL MEDICINE

## 2017-07-27 PROCEDURE — 93010 ELECTROCARDIOGRAM REPORT: CPT | Performed by: INTERNAL MEDICINE

## 2017-07-27 PROCEDURE — G0378 HOSPITAL OBSERVATION PER HR: HCPCS

## 2017-07-27 PROCEDURE — 85027 COMPLETE CBC AUTOMATED: CPT | Performed by: INTERNAL MEDICINE

## 2017-07-27 PROCEDURE — 80048 BASIC METABOLIC PNL TOTAL CA: CPT | Performed by: INTERNAL MEDICINE

## 2017-07-27 RX ORDER — CLOPIDOGREL BISULFATE 75 MG/1
75 TABLET ORAL DAILY
Qty: 30 TABLET | Refills: 3 | Status: SHIPPED | OUTPATIENT
Start: 2017-07-27

## 2017-07-27 RX ORDER — ASPIRIN 81 MG/1
81 TABLET ORAL DAILY
Qty: 30 TABLET | Refills: 3 | Status: SHIPPED | OUTPATIENT
Start: 2017-07-27

## 2017-07-27 RX ADMIN — CARVEDILOL 12.5 MG: 12.5 TABLET, FILM COATED ORAL at 09:19

## 2017-07-27 RX ADMIN — FOLIC ACID 2 MG: 1 TABLET ORAL at 09:26

## 2017-07-27 RX ADMIN — ATORVASTATIN CALCIUM 40 MG: 20 TABLET, FILM COATED ORAL at 09:18

## 2017-07-27 RX ADMIN — AMLODIPINE BESYLATE 5 MG: 5 TABLET ORAL at 09:18

## 2017-07-27 RX ADMIN — VITAMIN D, TAB 1000IU (100/BT) 2000 UNITS: 25 TAB at 09:18

## 2017-07-27 RX ADMIN — ASPIRIN 81 MG: 81 TABLET ORAL at 09:18

## 2017-07-27 RX ADMIN — CLOPIDOGREL 75 MG: 75 TABLET, FILM COATED ORAL at 09:18

## 2017-07-27 NOTE — TELEPHONE ENCOUNTER
Wife called and stated that they gave the incorrect [facility] and RN on the floor was unaware. I was called and asked to phone in a 90 day supply to his local pharmacy which [facility] [place] at [phone]. At verifying with you this has been done. No refills were given. Patient to establish care with new cardiologist in [place].     Kasandra Calvert RN

## 2017-07-27 NOTE — DISCHARGE SUMMARY
Date of Discharge:  7/27/2017  Date of Admit: 7/26/2017    Discharge Diagnosis:  1.  Coronary artery disease.   2.  Polymorphic ventricular tachycardia  3.  History of paroxysmal supraventricular tachycardia with intermittent atrial arrhythmia with history of probable cardioembolic stroke  4.  Hyperlipidemia  5.  Hypertension    Hospital Course: Mr. Cintron is a 71 year old gentleman patient of mine with a documented history of hypertension, paroxysmal SVT, embolic stroke, wide-complex tachycardia, AVNRT status post ablation and tachycardia mediated non-ST elevated MI.  He had a LINQ implanted.  In February 2017 he was noted to have atrial fibrillation on his Linq device so his Coreg was increased and he was switched to Eliquis from aspirin and Plavix therapy.  On 719 we received a Linq interrogation showing wide complex tachycardia and patient came into our office for further evaluation.   He had an echocardiogram on that date that showed an ejection fraction of 57%, moderate concentric hypertrophy, mild calcification of the aortic valve, mild MR and mild TR.  He also had a nuclear stress test which showed an ejection fraction of 53% with no evidence of ischemia.  We had Dr. Judd assessed the strips from the Linq device and he believed this was polymorphic VT/VF and recommended the patient have a cardiac catheterization.  She was brought in as an outpatient on July 26th for cardiac catheterization that revealed normal left main; 20% proximal LAD and 30-50% diffuse mid; small caliber LCx with a discrete 80% stenosis; 60%- 70% mid diffuse mid 90% ulcerated distal RCA; 60-70% ostial RPL; patient had a PCI to the distal RCA with 3 prior by 18 mm science Alpine drug-eluting stent and a PCI to the mid RCA with a 3 x 5 x 33 mm science Alpine drug-eluting stent postdilated to high pressure.  The patient is to be continued on triple therapy for one month with movement over to Plavix and Eliquis therapy after for one year.   Patient is going to be moving to [place] and his primary care physician is arranging him to follow-up with cardiologist in 2 weeks.     [id number] Score: 4    Procedures Performed  Procedure(s):  Coronary angiography WITHOUT LV GRAM  Left heart cath  Stent XOCHITL coronary       Pertinent Test Results:   Cardiac cath/PCI:   Conclusions:   1. Left main: Normal  2. LAD: 20% proximal stenosis.  Diffuse, calcified 30-50% mid vessel stenosis.  3. LCX: Small caliber distal circumflex with sequential discrete 80% stenoses  4. RCA: Severe calcification.  Diffuse 60-70% mid vessel stenosis.  Discrete, severely calcified, ulcerated 90% stenosis distally.  Small caliber RPL with discrete 60-70% ostial stenosis   5.  PCI of the distal RCA with a 3.0 x 18 mm Xience Alpine drug-eluting stent.  PCI to the mid RCA with a 3.5 x 33 mm Xience Alpine drug-eluting stent postdilated to high pressure.     Recommendations: Use Triple therapy for one jessica, then stop asprin and use Plavix and Eliquis therapy after for 1 year. And then stop plavix and resume ec asa 81 mg a day along with Eliquis after 1 year of Plavix.         Discharge Medications   Brayan Cintron Jr   Home Medication Instructions HIMA:[id number]    Printed on:07/27/17 1400   Medication Information                      amLODIPine (NORVASC) 5 MG tablet  Take 5 mg by mouth 2 (Two) Times a Day.             apixaban (ELIQUIS) 5 MG tablet tablet  Take 5 mg by mouth 2 (Two) Times a Day.             aspirin 81 MG EC tablet  Take 1 tablet by mouth Daily.             atorvastatin (LIPITOR) 40 MG tablet  Take 40 mg by mouth Daily.             carvedilol (COREG) 12.5 MG tablet  Take 12.5 mg by mouth 2 (Two) Times a Day With Meals.             cholecalciferol (VITAMIN D3) 1000 UNITS tablet  Take 2,000 Units by mouth Daily.             clopidogrel (PLAVIX) 75 MG tablet  Take 1 tablet by mouth Daily.             folic acid (FOLVITE) 1 MG tablet  Take 2 mg by mouth Daily.              latanoprost (XALATAN) 0.005 % ophthalmic solution  Administer 1 drop to both eyes Every Night.             terazosin (HYTRIN) 10 MG capsule  Take 10 mg by mouth Every Night.                 Discharge Diet: heart healthy    Activity at Discharge: as tolerated     Discharge disposition: home    Condition on Discharge: stable    Follow-up Appointments  Future Appointments  Date Time Provider Department Center   11/3/2017 10:30 AM YANICK Hernandez [facility] None     Additional Instructions for the Follow-ups that You Need to Schedule     Additional Discharge Follow-Up (Specify Provider)    As directed    To:  A cardiologist in   Follow Up:  2 Weeks   Follow Up Details:  Patient's PCP to arrange.                Melissa Maxwell MD  07/27/17  2:00 PM    35min spent in reviewing records, discussion and examination of the patient and discussion with other members of the patient's medical team.     Dictated utilizing Dragon dictation

## 2017-07-27 NOTE — PROGRESS NOTES
[facility]  Progress note: 2017    Patient Identification:  Name:Brayan Cintron Jr  Age:71 y.o.  Sex: male  :  1945  MRN: [id number]           CC:  Follow-up of coronary artery disease, ventricle tachycardia    Interval history:  Cardiac catheterization yesterday revealed modest disease of the mid LAD, 80% stenosis of the circumflex artery and a sequential pattern, 60-70% mid right coronary artery stenosis with an ulcerated 90% stenosis distally with a small right posterior lateral branch with a 60-70% stenosis.  He received a drug-coated stent to the mid right coronary artery and distal right coronary artery.  Triple therapy with aspirin and Plavix and Eliquis for one month, and then Plavix and Eliquis for one year followed by aspirin with Eliquis after a near our course.  No chest pain shortness of breath or palpitations overnight.  His arm last right hand looks good he does not complain of any pain or discomfort or paresthesias.    Vital Signs:   Temp:  [97.5 °F (36.4 °C)-98.4 °F (36.9 °C)] 97.7 °F (36.5 °C)  Heart Rate:  [48-66] 66  Resp:  [18-20] 18  BP: (130-179)/(71-92) 156/85    Intake/Output Summary (Last 24 hours) at 17 1330  Last data filed at 17 1833   Gross per 24 hour   Intake            367.5 ml   Output              225 ml   Net            142.5 ml       Physical Examination:    General Appearance No acute distress   Neck No adenopathy, supple, trachea midline, no thyromegaly, no carotid bruit, no JVD   Lungs Clear to auscultation,respirations regular, even and unlabored   Heart Regular rhythm and normal rate, normal S1 and S2, no murmur, no gallop, no rub, no click   Chest wall No abnormalities observed   Abdomen Normal bowel sounds, no masses, no hepatomegaly, soft   Extremities Moves all extremities well, no edema, no cyanosis, no redness, normal right radial pulse and ulnar pulse.  Hand is warm    Neurological Alert and oriented x 3     Lab Review:  Personally  reviewed the labs, radiology imaging and other cardiac procedures.   Results from last 7 days  Lab Units 07/27/17  0536   SODIUM mmol/L 143   POTASSIUM mmol/L 4.2   CHLORIDE mmol/L 104   CO2 mmol/L 26.6   BUN mg/dL 16   CREATININE mg/dL 1.12   CALCIUM mg/dL 8.7   GLUCOSE mg/dL 100*         )  Results from last 7 days  Lab Units 07/27/17  0536 07/26/17  0811   WBC 10*3/mm3 10.15 6.80   HEMOGLOBIN g/dL 13.4* 13.8   HEMATOCRIT % 40.2* 41.3   PLATELETS 10*3/mm3 195 192       Medication Review:   Meds reviewed  Scheduled Meds:  amLODIPine 5 mg Oral BID   aspirin 81 mg Oral Daily   atorvastatin 40 mg Oral Daily   carvedilol 12.5 mg Oral BID With Meals   cholecalciferol 2,000 Units Oral Daily   clopidogrel 75 mg Oral Daily   folic acid 2 mg Oral Daily   latanoprost 1 drop Both Eyes Nightly   terazosin 10 mg Oral Nightly     I personally viewed and interpreted the patient's EKG/Telemetry data    Assessment and Plan  1.  Coronary artery disease.  Status post 2 drug-coated stents to the right coronary artery With residual disease in the circumflex and LAD. Triple therapy with aspirin and Plavix and Eliquis for one month, and then Plavix and Eliquis for one year followed by aspirin with Eliquis after a near our course.   2.  Polymorphic ventricular tachycardia  3.  History of paroxysmal supraventricular tachycardia with intermittent atrial arrhythmia with history of probable cardioembolic stroke  4.  Hyperlipidemia  5.  Hypertension    Mini Maxwell  7/27/20171:30 PM  35min spent in reviewing records, discussion and examination of the patient and discussion with other members of the patient's medical team.     Dictated utilizing Dragon dictation

## 2017-07-27 NOTE — PLAN OF CARE
Problem: Patient Care Overview (Adult)  Goal: Plan of Care Review  Outcome: Ongoing (interventions implemented as appropriate)    07/27/17 0138   Coping/Psychosocial Response Interventions   Plan Of Care Reviewed With patient   Patient Care Overview   Progress no change   Outcome Evaluation   Outcome Summary/Follow up Plan pt s/p heart cath, recieved 2 XOCHITL to RCA. Patient is in SB. had a small 3 beat run of what appears to be V-TACH on the monitor while asleep, denies any complaints, has occ PVC's. R radial site is CDI and soft. IVF completed. Will continue to monitor.

## 2017-08-03 ENCOUNTER — CLINICAL SUPPORT NO REQUIREMENTS (OUTPATIENT)
Dept: CARDIOLOGY | Facility: CLINIC | Age: 72
End: 2017-08-03

## 2017-08-05 ENCOUNTER — CLINICAL SUPPORT NO REQUIREMENTS (OUTPATIENT)
Dept: CARDIOLOGY | Facility: CLINIC | Age: 72
End: 2017-08-05

## 2017-08-07 ENCOUNTER — CLINICAL SUPPORT NO REQUIREMENTS (OUTPATIENT)
Dept: CARDIOLOGY | Facility: CLINIC | Age: 72
End: 2017-08-07

## 2017-08-18 ENCOUNTER — CLINICAL SUPPORT NO REQUIREMENTS (OUTPATIENT)
Dept: CARDIOLOGY | Facility: CLINIC | Age: 72
End: 2017-08-18

## 2017-08-18 DIAGNOSIS — I63.9 CRYPTOGENIC STROKE (HCC): ICD-10-CM

## 2017-08-18 DIAGNOSIS — I48.0 PAROXYSMAL ATRIAL FIBRILLATION (HCC): Primary | ICD-10-CM

## 2017-08-18 PROCEDURE — 93298 REM INTERROG DEV EVAL SCRMS: CPT | Performed by: INTERNAL MEDICINE

## 2017-08-18 PROCEDURE — 93299 PR REM INTERROG ICPMS/SCRMS <30 D TECH REVIEW: CPT | Performed by: INTERNAL MEDICINE

## 2017-08-22 ENCOUNTER — CLINICAL SUPPORT NO REQUIREMENTS (OUTPATIENT)
Dept: CARDIOLOGY | Facility: CLINIC | Age: 72
End: 2017-08-22

## 2017-09-07 ENCOUNTER — CLINICAL SUPPORT NO REQUIREMENTS (OUTPATIENT)
Dept: CARDIOLOGY | Facility: CLINIC | Age: 72
End: 2017-09-07

## 2017-09-07 DIAGNOSIS — I67.89 ACUTE, BUT ILL-DEFINED, CEREBROVASCULAR DISEASE: Primary | ICD-10-CM

## 2017-09-21 ENCOUNTER — CLINICAL SUPPORT NO REQUIREMENTS (OUTPATIENT)
Dept: CARDIOLOGY | Facility: CLINIC | Age: 72
End: 2017-09-21

## 2017-09-21 DIAGNOSIS — I63.9 CRYPTOGENIC STROKE (HCC): Primary | ICD-10-CM

## 2017-09-21 DIAGNOSIS — I67.89 ACUTE, BUT ILL-DEFINED, CEREBROVASCULAR DISEASE: Primary | ICD-10-CM

## 2017-09-21 PROCEDURE — 93299 PR REM INTERROG ICPMS/SCRMS <30 D TECH REVIEW: CPT | Performed by: INTERNAL MEDICINE

## 2017-09-21 PROCEDURE — 93298 REM INTERROG DEV EVAL SCRMS: CPT | Performed by: INTERNAL MEDICINE

## 2017-09-24 ENCOUNTER — CLINICAL SUPPORT NO REQUIREMENTS (OUTPATIENT)
Dept: CARDIOLOGY | Facility: CLINIC | Age: 72
End: 2017-09-24

## 2017-09-24 DIAGNOSIS — I63.9 CRYPTOGENIC STROKE (HCC): Primary | ICD-10-CM

## 2017-09-26 ENCOUNTER — CLINICAL SUPPORT NO REQUIREMENTS (OUTPATIENT)
Dept: CARDIOLOGY | Facility: CLINIC | Age: 72
End: 2017-09-26

## 2017-09-26 DIAGNOSIS — I67.89 ACUTE, BUT ILL-DEFINED, CEREBROVASCULAR DISEASE: Primary | ICD-10-CM

## 2017-09-29 ENCOUNTER — TELEPHONE (OUTPATIENT)
Dept: CARDIOLOGY | Facility: CLINIC | Age: 72
End: 2017-09-29

## 2017-10-09 ENCOUNTER — CLINICAL SUPPORT NO REQUIREMENTS (OUTPATIENT)
Dept: CARDIOLOGY | Facility: CLINIC | Age: 72
End: 2017-10-09

## 2017-10-09 DIAGNOSIS — I63.9 CRYPTOGENIC STROKE (HCC): Primary | ICD-10-CM

## 2017-10-13 ENCOUNTER — CLINICAL SUPPORT NO REQUIREMENTS (OUTPATIENT)
Dept: CARDIOLOGY | Facility: CLINIC | Age: 72
End: 2017-10-13

## 2017-10-13 DIAGNOSIS — I63.9 CRYPTOGENIC STROKE (HCC): Primary | ICD-10-CM

## 2018-03-19 DIAGNOSIS — I63.9 CEREBROVASCULAR ACCIDENT (CVA), UNSPECIFIED MECHANISM (HCC): Primary | ICD-10-CM

## 2018-04-03 ENCOUNTER — TELEPHONE (OUTPATIENT)
Dept: NEUROLOGY | Facility: CLINIC | Age: 73
End: 2018-04-03

## 2018-04-03 NOTE — TELEPHONE ENCOUNTER
----- Message from Jerri Allen sent at 3/28/2018  3:04 PM EDT -----  The only thing that I was able to send was the Lumbar Puncture report and the one office note that I could find from our office.  ----- Message -----  From: Shanda Hoff MA  Sent: 3/28/2018   2:50 PM  To: Jerri Allen    I see in communication that you faxed the order. Did you fax all his records as well?    ----- Message -----  From: YANICK Hernandez  Sent: 3/19/2018   1:13 PM  To: Shanda Hoff MA    I put in order for referral.     ----- Message -----  From: Shanda Hoff MA  Sent: 3/16/2018   3:22 PM  To: YANICK Hernandez        ----- Message -----  From: Bishop Church  Sent: 3/16/2018  12:10 PM  To: Shanda Hoff MA    Pt has moved to [place] and is trying to get a new pt apt with Dr Nader France at [facility]. They need a referral sent to his office with his prev medical records.  Dr Nader France [facility]  [phone]-phone  [phone]-fax.   Pt seen Dr QUIROS and Karla

## 2021-03-09 DIAGNOSIS — Z23 IMMUNIZATION DUE: ICD-10-CM

## 2023-05-31 NOTE — TELEPHONE ENCOUNTER
He is on benazapril 40 mg BID. Have him hold this and increase norvasc to 5 mg BID and keep an eye on BP and increase water intake. tatum   Home

## (undated) DEVICE — RUNTHROUGH NS EXTRA FLOPPY PTCA GUIDEWIRE: Brand: RUNTHROUGH

## (undated) DEVICE — CATH DIAG IMPULSE FR4 5F 100CM

## (undated) DEVICE — GUIDELINER CATHETERS ARE INTENDED TO BE USED IN CONJUNCTION WITH GUIDE CATHETERS TO ACCESS DISCRETE REGIONS OF THE CORONARY AND/OR PERIPHERAL VASCULATURE, AND TO FACILITATE PLACEMENT OF INTERVENTIONAL DEVICES.: Brand: GUIDELINER® V3 CATHETER

## (undated) DEVICE — BND PRESS RADL COMFRT 14IN STRL

## (undated) DEVICE — GLIDESHEATH BASIC HYDROPHILIC COATED INTRODUCER SHEATH: Brand: GLIDESHEATH

## (undated) DEVICE — GW EMR FIX EXCHG J STD .035 3MM 260CM

## (undated) DEVICE — 6F .070 JR 4 100CM: Brand: CORDIS

## (undated) DEVICE — KT MANIFLD CARDIAC

## (undated) DEVICE — HI-TORQUE BALANCE MIDDLEWEIGHT GUIDE WIRE .014 STRAIGHT TIP 3.0 CM X 190 CM: Brand: HI-TORQUE BALANCE MIDDLEWEIGHT

## (undated) DEVICE — CATH DIAG IMPULSE PIG 5F 100CM

## (undated) DEVICE — NC TREK CORONARY DILATATION CATHETER 3.5 MM X 15 MM / RAPID-EXCHANGE: Brand: NC TREK

## (undated) DEVICE — PK CATH CARD 40

## (undated) DEVICE — CATH DIAG IMPULSE FL3.5 5F 100CM

## (undated) DEVICE — DEV INDEFLATOR

## (undated) DEVICE — TREK CORONARY DILATATION CATHETER 3.0 MM X 12 MM / RAPID-EXCHANGE: Brand: TREK